# Patient Record
Sex: MALE | Race: WHITE | Employment: OTHER | URBAN - METROPOLITAN AREA
[De-identification: names, ages, dates, MRNs, and addresses within clinical notes are randomized per-mention and may not be internally consistent; named-entity substitution may affect disease eponyms.]

---

## 2017-03-07 ENCOUNTER — OFFICE VISIT - HEALTHEAST (OUTPATIENT)
Dept: FAMILY MEDICINE | Facility: CLINIC | Age: 37
End: 2017-03-07

## 2017-03-07 DIAGNOSIS — Z02.4 ENCOUNTER FOR DEPARTMENT OF TRANSPORTATION (DOT) EXAMINATION FOR TRUCKING LICENCE: ICD-10-CM

## 2017-03-07 ASSESSMENT — MIFFLIN-ST. JEOR: SCORE: 2062.72

## 2017-10-04 ENCOUNTER — OFFICE VISIT - HEALTHEAST (OUTPATIENT)
Dept: FAMILY MEDICINE | Facility: CLINIC | Age: 37
End: 2017-10-04

## 2017-10-04 DIAGNOSIS — N52.9 ERECTILE DYSFUNCTION: ICD-10-CM

## 2017-10-04 DIAGNOSIS — F17.200 NICOTINE DEPENDENCE: ICD-10-CM

## 2017-10-04 DIAGNOSIS — Z00.00 HEALTHCARE MAINTENANCE: ICD-10-CM

## 2017-10-04 DIAGNOSIS — R63.5 WEIGHT GAIN: ICD-10-CM

## 2017-10-04 LAB
CHOLEST SERPL-MCNC: 203 MG/DL
FASTING STATUS PATIENT QL REPORTED: YES
HBA1C MFR BLD: 5.5 % (ref 3.5–6)
HDLC SERPL-MCNC: 59 MG/DL
LDLC SERPL CALC-MCNC: 114 MG/DL
TRIGL SERPL-MCNC: 151 MG/DL

## 2017-10-04 ASSESSMENT — MIFFLIN-ST. JEOR: SCORE: 2069.24

## 2017-10-05 ENCOUNTER — COMMUNICATION - HEALTHEAST (OUTPATIENT)
Dept: FAMILY MEDICINE | Facility: CLINIC | Age: 37
End: 2017-10-05

## 2017-10-09 ENCOUNTER — OFFICE VISIT (OUTPATIENT)
Dept: FAMILY MEDICINE | Facility: CLINIC | Age: 37
End: 2017-10-09
Payer: COMMERCIAL

## 2017-10-09 ENCOUNTER — TELEPHONE (OUTPATIENT)
Dept: FAMILY MEDICINE | Facility: CLINIC | Age: 37
End: 2017-10-09

## 2017-10-09 VITALS
DIASTOLIC BLOOD PRESSURE: 78 MMHG | BODY MASS INDEX: 35.78 KG/M2 | TEMPERATURE: 98.1 F | SYSTOLIC BLOOD PRESSURE: 158 MMHG | HEIGHT: 71 IN | WEIGHT: 255.6 LBS | HEART RATE: 74 BPM

## 2017-10-09 DIAGNOSIS — E66.811 CLASS 1 OBESITY DUE TO EXCESS CALORIES WITH SERIOUS COMORBIDITY AND BODY MASS INDEX (BMI) OF 34.0 TO 34.9 IN ADULT: Primary | ICD-10-CM

## 2017-10-09 DIAGNOSIS — E66.09 CLASS 1 OBESITY DUE TO EXCESS CALORIES WITH SERIOUS COMORBIDITY AND BODY MASS INDEX (BMI) OF 34.0 TO 34.9 IN ADULT: Primary | ICD-10-CM

## 2017-10-09 PROCEDURE — 99203 OFFICE O/P NEW LOW 30 MIN: CPT | Performed by: FAMILY MEDICINE

## 2017-10-09 NOTE — MR AVS SNAPSHOT
"              After Visit Summary   10/9/2017    Savage Bustillo    MRN: [id number]           Patient Information     Date Of Birth          1980        Visit Information        Provider Department      10/9/2017 6:00 PM Bayron Walsh MD [facility]        Care Instructions    Get more veggies in diet .  Salad at least once a day, plus another serving of raw vegetables every day.    Eat less carbs, potatoes, rice, bread, pasta.    Consider weight watchers.  There is a good group in [place]  Or lifestyle program at  in [place]. [email or web address]      Check Blood pressure regularly  Your blood pressure was consistently high here.    I do not recommend medication for weight loss at this time.  I may reconsider after you have gone through a formal weight loss/nutrition program for at least 6 months.  Weight watchers, Moon mcgregor  Etc.               Follow-ups after your visit        Who to contact     Normal or non-critical lab and imaging results will be communicated to you by NeoDiagnostixhart, letter or phone within 4 business days after the clinic has received the results. If you do not hear from us within 7 days, please contact the clinic through [employer] Cor[employer] or phone. If you have a critical or abnormal lab result, we will notify you by phone as soon as possible.  Submit refill requests through [employer] or call your pharmacy and they will forward the refill request to us. Please allow 3 business days for your refill to be completed.          If you need to speak with a  for additional information , please call: [phone]             Additional Information About Your Visit        [employer] Information     [employer] lets you send messages to your doctor, view your test results, renew your prescriptions, schedule appointments and more. To sign up, go to [email or web address] . Click on \"Log in\" on the left side of the screen, which will take you to the " "Welcome page. Then click on \"Sign up Now\" on the right side of the page.     You will be asked to enter the access code listed below, as well as some personal information. Please follow the directions to create your username and password.     Your access code is: [email or web address]  Expires: 2018  6:23 PM     Your access code will  in 90 days. If you need help or a new code, please call your [place] clinic or [phone].        Care EveryWhere ID     This is your Care EveryWhere ID. This could be used by other organizations to access your [place] medical records  [id number]        Your Vitals Were     Pulse Temperature Height BMI (Body Mass Index)          74 98.1  F (36.7  C) (Tympanic) 5' 10.75\" (1.797 m) 35.9 kg/m2         Blood Pressure from Last 3 Encounters:   10/09/17 158/78   10/16/13 131/69   12 155/75    Weight from Last 3 Encounters:   10/09/17 255 lb 9.6 oz (115.9 kg)   10/16/13 212 lb (96.2 kg)   11 195 lb 3.2 oz (88.5 kg)              Today, you had the following     No orders found for display       Primary Care Provider    None Specified       No primary provider on file.        Equal Access to Services     [facility]: Hadii brian cornello Sojosemanuel, waaxda luqadaha, qaybta kaalmada adeegyada, sandra venegas . So [facility] [phone].    ATENCIÓN: Si habla español, tiene a dimas disposición servicios gratuitos de asistencia lingüística. Llame al [phone].    We comply with applicable federal civil rights laws and [place] laws. We do not discriminate on the basis of race, color, national origin, age, disability, sex, sexual orientation, or gender identity.            Thank you!     Thank you for choosing [facility]  for your care. Our goal is always to provide you with excellent care. Hearing back from our patients is one way we can continue to improve our services. Please take a few minutes to complete the written survey that you may receive " in the mail after your visit with us. Thank you!             Your Updated Medication List - Protect others around you: Learn how to safely use, store and throw away your medicines at www.disposemymeds.org.          This list is accurate as of: 10/9/17  6:23 PM.  Always use your most recent med list.                   Brand Name Dispense Instructions for use Diagnosis    ALEVE 220 MG capsule   Generic drug:  naproxen sodium      1 CAPSULE EVERY 12 HOURS AS NEEDED

## 2017-10-09 NOTE — PATIENT INSTRUCTIONS
Get more veggies in diet .  Salad at least once a day, plus another serving of raw vegetables every day.    Eat less carbs, potatoes, rice, bread, pasta.    Consider weight watchers.  There is a good group in [place]  Or lifestyle program at  in [place]. [email or web address]      Check Blood pressure regularly  Your blood pressure was consistently high here.    I do not recommend medication for weight loss at this time.  I may reconsider after you have gone through a formal weight loss/nutrition program for at least 6 months.  Weight watchers, Moon mcgregor  Etc.

## 2017-10-09 NOTE — PROGRESS NOTES
"SUBJECTIVE:                                                    Savage Bustillo is a 36 year old male who presents to clinic today for the following health issues:    Chief Complaint   Patient presents with     Establish Care     **Here to discuss weight loss.      Problem list and histories reviewed & adjusted, as indicated.  Additional history:     Patient Active Problem List   Diagnosis     Streptococcal sore throat     Abscess of anal and rectal regions     CARDIOVASCULAR SCREENING; LDL GOAL LESS THAN 160     History reviewed. No pertinent surgical history.    Social History   Substance Use Topics     Smoking status: Former Smoker     Packs/day: 0.50     Types: Cigarettes     Smokeless tobacco: Current User     Types: Chew      Comment: 3 tins a week     Alcohol use No      Comment: quit 1-2xweek     History reviewed. No pertinent family history.      ROS:  Constitutional, HEENT, cardiovascular, pulmonary, gi and gu systems are negative, except as otherwise noted.      OBJECTIVE:                                                    /90 (BP Location: Right arm, Patient Position: Sitting, Cuff Size: Adult Large)  Pulse 74  Temp 98.1  F (36.7  C) (Tympanic)  Ht 5' 10.75\" (1.797 m)  Wt 255 lb 9.6 oz (115.9 kg)  BMI 35.9 kg/m2 Body mass index is 35.9 kg/(m^2).   GENERAL: healthy, alert, well nourished, well hydrated, no distress  HENT: ear canals- normal; TMs- normal; Nose- normal; Mouth- no ulcers, no lesions  NECK: no tenderness, no adenopathy, no asymmetry, no masses, no stiffness; thyroid- normal to palpation  RESP: lungs clear to auscultation - no rales, no rhonchi, no wheezes  CV: regular rates and rhythm, normal S1 S2, no S3 or S4 and no murmur, no click or rub -  ABDOMEN: soft, no tenderness, no  hepatosplenomegaly, no masses, normal bowel sounds       ASSESSMENT/PLAN:                                                      (E66.09,  Z68.34) Class 2 obesity due to excess calories with serious " comorbidity and body mass index (BMI) of 35.0 to 35.9 in adult  (primary encounter diagnosis)   he wants medical management discussed phentermine, topamax, Xenical. I would not recommend a stimulant with his hypertension.  He also has never participated in a forml weight loss program.  He discussed healthy lifestyle program at the [facility] going on now,  He is interested in weight watchers. I discussed group in [place]  I think he will look for another provider who prescribe weight loss medication     Patient Instructions   Get more veggies in diet .  Salad at least once a day, plus another serving of raw vegetables every day.    Eat less carbs, potatoes, rice, bread, pasta.    Consider weight watchers.  There is a good group in [place]  Or lifestyle program at  in [place]. [email or web address]      Check Blood pressure regularly  Your blood pressure was consistently high here.    I do not recommend medication for weight loss at this time.  I may reconsider after you have gone through a formal weight loss/nutrition program for at least 6 months.  Weight watchers, Moon mcgregor  Etc.            reports that he has quit smoking. His smoking use included Cigarettes. He smoked 0.50 packs per day. His smokeless tobacco use includes Chew.    [facility]

## 2017-10-09 NOTE — TELEPHONE ENCOUNTER
Spoke with patient and said that the Provider here may if after assessment; it is determined to be a good plan. New patient appointment made for this evening.  Chaka Pruett RN

## 2017-10-09 NOTE — TELEPHONE ENCOUNTER
Reason for Call:  Other call back    Detailed comments: Savage LEFT MESSAGE:  He is wondering if our clinic prescribes weight loss medication?  No other information was left as to what name of medication, etc. Doesn't appear that he has been seen at the [facility].   Please call and assess. Thank you..Aissatou Fernandez    Phone Number Patient can be reached at: Home number on file [phone] (home)    Best Time: did not specify on message    Can we leave a detailed message on this number? did not specify on message    Call taken on 10/9/2017 at 11:56 AM by Aissatou Fernandez

## 2017-11-03 ENCOUNTER — OFFICE VISIT - HEALTHEAST (OUTPATIENT)
Dept: FAMILY MEDICINE | Facility: CLINIC | Age: 37
End: 2017-11-03

## 2017-11-03 ENCOUNTER — AMBULATORY - HEALTHEAST (OUTPATIENT)
Dept: LAB | Facility: CLINIC | Age: 37
End: 2017-11-03

## 2017-11-03 DIAGNOSIS — M79.672 FOOT PAIN, BILATERAL: ICD-10-CM

## 2017-11-03 DIAGNOSIS — M79.671 FOOT PAIN, BILATERAL: ICD-10-CM

## 2017-11-03 DIAGNOSIS — E66.09 CLASS 1 OBESITY DUE TO EXCESS CALORIES WITH SERIOUS COMORBIDITY AND BODY MASS INDEX (BMI) OF 34.0 TO 34.9 IN ADULT: ICD-10-CM

## 2017-11-03 DIAGNOSIS — R63.5 WEIGHT GAIN: ICD-10-CM

## 2017-11-03 DIAGNOSIS — E66.811 CLASS 1 OBESITY DUE TO EXCESS CALORIES WITH SERIOUS COMORBIDITY AND BODY MASS INDEX (BMI) OF 34.0 TO 34.9 IN ADULT: ICD-10-CM

## 2017-11-03 ASSESSMENT — MIFFLIN-ST. JEOR: SCORE: 2062.95

## 2017-11-04 ENCOUNTER — COMMUNICATION - HEALTHEAST (OUTPATIENT)
Dept: FAMILY MEDICINE | Facility: CLINIC | Age: 37
End: 2017-11-04

## 2017-11-07 ENCOUNTER — COMMUNICATION - HEALTHEAST (OUTPATIENT)
Dept: FAMILY MEDICINE | Facility: CLINIC | Age: 37
End: 2017-11-07

## 2017-11-09 ENCOUNTER — RECORDS - HEALTHEAST (OUTPATIENT)
Dept: ADMINISTRATIVE | Facility: OTHER | Age: 37
End: 2017-11-09

## 2017-11-15 ENCOUNTER — COMMUNICATION - HEALTHEAST (OUTPATIENT)
Dept: FAMILY MEDICINE | Facility: CLINIC | Age: 37
End: 2017-11-15

## 2017-11-17 ENCOUNTER — COMMUNICATION - HEALTHEAST (OUTPATIENT)
Dept: TELEHEALTH | Facility: CLINIC | Age: 37
End: 2017-11-17

## 2017-11-17 ENCOUNTER — OFFICE VISIT - HEALTHEAST (OUTPATIENT)
Dept: FAMILY MEDICINE | Facility: CLINIC | Age: 37
End: 2017-11-17

## 2017-11-17 DIAGNOSIS — S96.911A RIGHT FOOT STRAIN: ICD-10-CM

## 2017-11-17 ASSESSMENT — MIFFLIN-ST. JEOR: SCORE: 2040.32

## 2017-11-27 ENCOUNTER — COMMUNICATION - HEALTHEAST (OUTPATIENT)
Dept: FAMILY MEDICINE | Facility: CLINIC | Age: 37
End: 2017-11-27

## 2017-11-27 DIAGNOSIS — F17.200 NICOTINE DEPENDENCE: ICD-10-CM

## 2017-12-04 ENCOUNTER — OFFICE VISIT - HEALTHEAST (OUTPATIENT)
Dept: FAMILY MEDICINE | Facility: CLINIC | Age: 37
End: 2017-12-04

## 2017-12-04 DIAGNOSIS — E66.811 CLASS 1 OBESITY DUE TO EXCESS CALORIES WITH SERIOUS COMORBIDITY AND BODY MASS INDEX (BMI) OF 34.0 TO 34.9 IN ADULT: ICD-10-CM

## 2017-12-04 DIAGNOSIS — E66.09 CLASS 1 OBESITY DUE TO EXCESS CALORIES WITH SERIOUS COMORBIDITY AND BODY MASS INDEX (BMI) OF 34.0 TO 34.9 IN ADULT: ICD-10-CM

## 2017-12-04 ASSESSMENT — MIFFLIN-ST. JEOR: SCORE: 2028.02

## 2017-12-06 ENCOUNTER — OFFICE VISIT - HEALTHEAST (OUTPATIENT)
Dept: PODIATRY | Age: 37
End: 2017-12-06

## 2017-12-06 DIAGNOSIS — L84 CALLUS OF FOOT: ICD-10-CM

## 2017-12-06 DIAGNOSIS — M21.629 BUNIONETTE OF UNSPECIFIED FOOT: ICD-10-CM

## 2017-12-06 ASSESSMENT — MIFFLIN-ST. JEOR: SCORE: 2025.3

## 2017-12-08 ENCOUNTER — COMMUNICATION - HEALTHEAST (OUTPATIENT)
Dept: OTHER | Facility: CLINIC | Age: 37
End: 2017-12-08

## 2017-12-16 ENCOUNTER — COMMUNICATION - HEALTHEAST (OUTPATIENT)
Dept: FAMILY MEDICINE | Facility: CLINIC | Age: 37
End: 2017-12-16

## 2017-12-16 DIAGNOSIS — N52.9 ERECTILE DYSFUNCTION: ICD-10-CM

## 2018-01-05 ENCOUNTER — OFFICE VISIT - HEALTHEAST (OUTPATIENT)
Dept: FAMILY MEDICINE | Facility: CLINIC | Age: 38
End: 2018-01-05

## 2018-01-05 DIAGNOSIS — E66.09 CLASS 1 OBESITY DUE TO EXCESS CALORIES WITH SERIOUS COMORBIDITY AND BODY MASS INDEX (BMI) OF 34.0 TO 34.9 IN ADULT: ICD-10-CM

## 2018-01-05 DIAGNOSIS — E66.811 CLASS 1 OBESITY DUE TO EXCESS CALORIES WITH SERIOUS COMORBIDITY AND BODY MASS INDEX (BMI) OF 34.0 TO 34.9 IN ADULT: ICD-10-CM

## 2018-02-05 ENCOUNTER — OFFICE VISIT - HEALTHEAST (OUTPATIENT)
Dept: FAMILY MEDICINE | Facility: CLINIC | Age: 38
End: 2018-02-05

## 2018-02-05 DIAGNOSIS — E66.09 CLASS 1 OBESITY DUE TO EXCESS CALORIES WITH SERIOUS COMORBIDITY AND BODY MASS INDEX (BMI) OF 34.0 TO 34.9 IN ADULT: ICD-10-CM

## 2018-02-05 DIAGNOSIS — E78.1 HYPERTRIGLYCERIDEMIA: ICD-10-CM

## 2018-02-05 DIAGNOSIS — E66.811 CLASS 1 OBESITY DUE TO EXCESS CALORIES WITH SERIOUS COMORBIDITY AND BODY MASS INDEX (BMI) OF 34.0 TO 34.9 IN ADULT: ICD-10-CM

## 2018-03-06 ENCOUNTER — OFFICE VISIT - HEALTHEAST (OUTPATIENT)
Dept: FAMILY MEDICINE | Facility: CLINIC | Age: 38
End: 2018-03-06

## 2018-03-06 DIAGNOSIS — E78.1 HYPERTRIGLYCERIDEMIA: ICD-10-CM

## 2018-03-06 DIAGNOSIS — E66.09 CLASS 1 OBESITY DUE TO EXCESS CALORIES WITH SERIOUS COMORBIDITY AND BODY MASS INDEX (BMI) OF 34.0 TO 34.9 IN ADULT: ICD-10-CM

## 2018-03-06 DIAGNOSIS — E66.811 CLASS 1 OBESITY DUE TO EXCESS CALORIES WITH SERIOUS COMORBIDITY AND BODY MASS INDEX (BMI) OF 34.0 TO 34.9 IN ADULT: ICD-10-CM

## 2018-03-06 ASSESSMENT — MIFFLIN-ST. JEOR: SCORE: 2044.35

## 2018-04-04 ENCOUNTER — COMMUNICATION - HEALTHEAST (OUTPATIENT)
Dept: FAMILY MEDICINE | Facility: CLINIC | Age: 38
End: 2018-04-04

## 2018-04-04 DIAGNOSIS — F17.200 NICOTINE DEPENDENCE: ICD-10-CM

## 2018-05-14 ENCOUNTER — OFFICE VISIT - HEALTHEAST (OUTPATIENT)
Dept: FAMILY MEDICINE | Facility: CLINIC | Age: 38
End: 2018-05-14

## 2018-05-14 DIAGNOSIS — J40 BRONCHITIS: ICD-10-CM

## 2018-05-16 ENCOUNTER — COMMUNICATION - HEALTHEAST (OUTPATIENT)
Dept: FAMILY MEDICINE | Facility: CLINIC | Age: 38
End: 2018-05-16

## 2018-05-31 ENCOUNTER — RECORDS - HEALTHEAST (OUTPATIENT)
Dept: GENERAL RADIOLOGY | Facility: CLINIC | Age: 38
End: 2018-05-31

## 2018-05-31 ENCOUNTER — OFFICE VISIT - HEALTHEAST (OUTPATIENT)
Dept: FAMILY MEDICINE | Facility: CLINIC | Age: 38
End: 2018-05-31

## 2018-05-31 DIAGNOSIS — R05.9 COUGH: ICD-10-CM

## 2018-06-04 ENCOUNTER — COMMUNICATION - HEALTHEAST (OUTPATIENT)
Dept: FAMILY MEDICINE | Facility: CLINIC | Age: 38
End: 2018-06-04

## 2018-06-04 DIAGNOSIS — N52.9 ERECTILE DYSFUNCTION: ICD-10-CM

## 2018-07-16 ENCOUNTER — RECORDS - HEALTHEAST (OUTPATIENT)
Dept: GENERAL RADIOLOGY | Facility: CLINIC | Age: 38
End: 2018-07-16

## 2018-07-16 ENCOUNTER — OFFICE VISIT - HEALTHEAST (OUTPATIENT)
Dept: FAMILY MEDICINE | Facility: CLINIC | Age: 38
End: 2018-07-16

## 2018-07-16 DIAGNOSIS — R05.9 COUGH: ICD-10-CM

## 2018-07-16 ASSESSMENT — MIFFLIN-ST. JEOR: SCORE: 2113.18

## 2018-07-26 ENCOUNTER — COMMUNICATION - HEALTHEAST (OUTPATIENT)
Dept: FAMILY MEDICINE | Facility: CLINIC | Age: 38
End: 2018-07-26

## 2018-07-26 DIAGNOSIS — N52.9 ERECTILE DYSFUNCTION: ICD-10-CM

## 2018-08-06 ENCOUNTER — COMMUNICATION - HEALTHEAST (OUTPATIENT)
Dept: FAMILY MEDICINE | Facility: CLINIC | Age: 38
End: 2018-08-06

## 2018-08-06 DIAGNOSIS — F17.200 NICOTINE DEPENDENCE: ICD-10-CM

## 2018-08-07 ENCOUNTER — TRANSFERRED RECORDS (OUTPATIENT)
Dept: HEALTH INFORMATION MANAGEMENT | Facility: CLINIC | Age: 38
End: 2018-08-07

## 2018-08-07 ENCOUNTER — COMMUNICATION - HEALTHEAST (OUTPATIENT)
Dept: FAMILY MEDICINE | Facility: CLINIC | Age: 38
End: 2018-08-07

## 2018-08-07 LAB
ALT SERPL-CCNC: 30 U/L (ref 0–52)
AST SERPL-CCNC: 30 U/L (ref 0–39)

## 2018-08-09 LAB
CREAT SERPL-MCNC: 0.6 MG/DL (ref 0.8–1.3)
GFR SERPL CREATININE-BSD FRML MDRD: 152 ML/MIN/1.73M2
GLUCOSE SERPL-MCNC: 92 MG/DL (ref 70–105)
POTASSIUM SERPL-SCNC: 4.8 MMOL/L (ref 3.5–5.1)

## 2018-08-13 ENCOUNTER — HOSPITAL ENCOUNTER (EMERGENCY)
Facility: CLINIC | Age: 38
Discharge: HOME | End: 2018-08-13
Attending: EMERGENCY MEDICINE | Admitting: EMERGENCY MEDICINE
Payer: COMMERCIAL

## 2018-08-13 ENCOUNTER — COMMUNICATION - HEALTHEAST (OUTPATIENT)
Dept: SCHEDULING | Facility: CLINIC | Age: 38
End: 2018-08-13

## 2018-08-13 ENCOUNTER — APPOINTMENT (OUTPATIENT)
Dept: GENERAL RADIOLOGY | Facility: CLINIC | Age: 38
End: 2018-08-13
Attending: EMERGENCY MEDICINE
Payer: COMMERCIAL

## 2018-08-13 VITALS
WEIGHT: 270 LBS | TEMPERATURE: 99.2 F | DIASTOLIC BLOOD PRESSURE: 98 MMHG | SYSTOLIC BLOOD PRESSURE: 151 MMHG | OXYGEN SATURATION: 96 % | RESPIRATION RATE: 21 BRPM | HEART RATE: 77 BPM | BODY MASS INDEX: 36.57 KG/M2 | HEIGHT: 72 IN

## 2018-08-13 DIAGNOSIS — R07.1 PAINFUL RESPIRATION: ICD-10-CM

## 2018-08-13 DIAGNOSIS — J18.9 PNEUMONIA OF RIGHT LOWER LOBE DUE TO INFECTIOUS ORGANISM: ICD-10-CM

## 2018-08-13 PROCEDURE — 71046 X-RAY EXAM CHEST 2 VIEWS: CPT

## 2018-08-13 PROCEDURE — G0463 HOSPITAL OUTPT CLINIC VISIT: HCPCS | Mod: 25

## 2018-08-13 PROCEDURE — 25000125 ZZHC RX 250: Performed by: EMERGENCY MEDICINE

## 2018-08-13 PROCEDURE — 94640 AIRWAY INHALATION TREATMENT: CPT

## 2018-08-13 PROCEDURE — 25000132 ZZH RX MED GY IP 250 OP 250 PS 637: Performed by: EMERGENCY MEDICINE

## 2018-08-13 PROCEDURE — 99214 OFFICE O/P EST MOD 30 MIN: CPT | Mod: Z6 | Performed by: EMERGENCY MEDICINE

## 2018-08-13 RX ORDER — IBUPROFEN 200 MG
800 TABLET ORAL ONCE
Status: COMPLETED | OUTPATIENT
Start: 2018-08-13 | End: 2018-08-13

## 2018-08-13 RX ORDER — IPRATROPIUM BROMIDE AND ALBUTEROL SULFATE 2.5; .5 MG/3ML; MG/3ML
1 SOLUTION RESPIRATORY (INHALATION) EVERY 6 HOURS PRN
Qty: 30 VIAL | Refills: 1 | Status: SHIPPED | OUTPATIENT
Start: 2018-08-13 | End: 2021-03-17

## 2018-08-13 RX ORDER — PREDNISONE 20 MG/1
40 TABLET ORAL DAILY
Qty: 10 TABLET | Refills: 0 | Status: SHIPPED | OUTPATIENT
Start: 2018-08-13 | End: 2018-08-18

## 2018-08-13 RX ORDER — IPRATROPIUM BROMIDE AND ALBUTEROL SULFATE 2.5; .5 MG/3ML; MG/3ML
3 SOLUTION RESPIRATORY (INHALATION) ONCE
Status: COMPLETED | OUTPATIENT
Start: 2018-08-13 | End: 2018-08-13

## 2018-08-13 RX ORDER — ALBUTEROL SULFATE 90 UG/1
2 AEROSOL, METERED RESPIRATORY (INHALATION) EVERY 6 HOURS
Qty: 1 INHALER | Refills: 0 | Status: SHIPPED | OUTPATIENT
Start: 2018-08-13 | End: 2021-03-17

## 2018-08-13 RX ORDER — IBUPROFEN 800 MG/1
800 TABLET, FILM COATED ORAL EVERY 8 HOURS PRN
Qty: 60 TABLET | Refills: 0 | Status: SHIPPED | OUTPATIENT
Start: 2018-08-13 | End: 2018-08-21

## 2018-08-13 RX ADMIN — IPRATROPIUM BROMIDE AND ALBUTEROL SULFATE 3 ML: .5; 3 SOLUTION RESPIRATORY (INHALATION) at 17:19

## 2018-08-13 RX ADMIN — IBUPROFEN 800 MG: 200 TABLET, FILM COATED ORAL at 17:18

## 2018-08-13 NOTE — ED AVS SNAPSHOT
[facility]    [address] [address]    Phone:  [phone]    Fax:  [phone]                                       Savage Bustillo   MRN: [id number]    Department:  [facility]   Date of Visit:  8/13/2018           After Visit Summary Signature Page     I have received my discharge instructions, and my questions have been answered. I have discussed any challenges I see with this plan with the nurse or doctor.    ..........................................................................................................................................  Patient/Patient Representative Signature      ..........................................................................................................................................  Patient Representative Print Name and Relationship to Patient    ..................................................               ................................................  Date                                            Time    ..........................................................................................................................................  Reviewed by Signature/Title    ...................................................              ..............................................  Date                                                            Time

## 2018-08-13 NOTE — ED AVS SNAPSHOT
[facility]    [address] [address]    Phone:  [phone]    Fax:  [phone]                                       Savage Bustillo   MRN: [id number]    Department:  [facility]   Date of Visit:  8/13/2018           Patient Information     Date Of Birth          1980        Your diagnoses for this visit were:     Painful respiration     Pneumonia of right lower lobe due to infectious organism (H)        You were seen by Shelton Johnson MD.      24 Hour Appointment Hotline       To make an appointment at any [facility], call [phone] ([phone]). If you don't have a family doctor or clinic, we will help you find one. [facility] clinics are conveniently located to serve the needs of you and your family.             Review of your medicines      START taking        Dose / Directions Last dose taken    albuterol 108 (90 Base) MCG/ACT inhaler   Commonly known as:  PROAIR HFA/PROVENTIL HFA/VENTOLIN HFA   Dose:  2 puff   Quantity:  1 Inhaler        Inhale 2 puffs into the lungs every 6 hours for 10 days   Refills:  0        amoxicillin-clavulanate 875-125 MG per tablet   Commonly known as:  AUGMENTIN   Dose:  1 tablet   Quantity:  14 tablet        Take 1 tablet by mouth 2 times daily for 7 days   Refills:  0        ibuprofen 800 MG tablet   Commonly known as:  ADVIL/MOTRIN   Dose:  800 mg   Quantity:  60 tablet        Take 1 tablet (800 mg) by mouth every 8 hours as needed for moderate pain   Refills:  0        ipratropium - albuterol 0.5 mg/2.5 mg/3 mL 0.5-2.5 (3) MG/3ML neb solution   Commonly known as:  DUONEB   Dose:  1 vial   Quantity:  30 vial        Take 1 vial (3 mLs) by nebulization every 6 hours as needed for shortness of breath / dyspnea or wheezing   Refills:  1        predniSONE 20 MG tablet   Commonly known as:  DELTASONE   Dose:  40 mg   Quantity:  10 tablet        Take 2 tablets (40 mg) by mouth daily for 5  days   Refills:  0          Our records show that you are taking the medicines listed below. If these are incorrect, please call your family doctor or clinic.        Dose / Directions Last dose taken    ALEVE 220 MG capsule   Generic drug:  naproxen sodium        1 CAPSULE EVERY 12 HOURS AS NEEDED   Refills:  0        DOXYCYCLINE HYCLATE PO        Refills:  0        METHADONE HCL PO        Refills:  0                Information about OPIOIDS     PRESCRIPTION OPIOIDS: WHAT YOU NEED TO KNOW   We gave you an opioid (narcotic) pain medicine. It is important to manage your pain, but opioids are not always the best choice. You should first try all the other options your care team gave you. Take this medicine for as short a time (and as few doses) as possible.    Some activities can increase your pain, such as bandage changes or therapy sessions. It may help to take your pain medicine 30 to 60 minutes before these activities. Reduce your stress by getting enough sleep, working on hobbies you enjoy and practicing relaxation or meditation. Talk to your care team about ways to manage your pain beyond prescription opioids.    These medicines have risks:    DO NOT drive when on new or higher doses of pain medicine. These medicines can affect your alertness and reaction times, and you could be arrested for driving under the influence (DUI). If you need to use opioids long-term, talk to your care team about driving.    DO NOT operate heavy machinery    DO NOT do any other dangerous activities while taking these medicines.    DO NOT drink any alcohol while taking these medicines.     If the opioid prescribed includes acetaminophen, DO NOT take with any other medicines that contain acetaminophen. Read all labels carefully. Look for the word  acetaminophen  or  Tylenol.  Ask your pharmacist if you have questions or are unsure.    You can get addicted to pain medicines, especially if you have a history of addiction (chemical, alcohol  or substance dependence). Talk to your care team about ways to reduce this risk.    All opioids tend to cause constipation. Drink plenty of water and eat foods that have a lot of fiber, such as fruits, vegetables, prune juice, apple juice and high-fiber cereal. Take a laxative (Miralax, milk of magnesia, Colace, Senna) if you don t move your bowels at least every other day. Other side effects include upset stomach, sleepiness, dizziness, throwing up, tolerance (needing more of the medicine to have the same effect), physical dependence and slowed breathing.    Store your pills in a secure place, locked if possible. We will not replace any lost or stolen medicine. If you don t finish your medicine, please throw away (dispose) as directed by your pharmacist. The [place] Pollution Control Agency has more information about safe disposal: https://www.pca.[facility].mn.us/living-green/managing-unwanted-medications        Prescriptions were sent or printed at these locations (5 Prescriptions)                   [facility] [place] [id number]   [address] [address]    Telephone:  [phone]   Fax:  [phone]   Hours:                  E-Prescribed (5 of 5)         albuterol (PROAIR HFA/PROVENTIL HFA/VENTOLIN HFA) 108 (90 Base) MCG/ACT inhaler               amoxicillin-clavulanate (AUGMENTIN) 875-125 MG per tablet               ibuprofen (ADVIL/MOTRIN) 800 MG tablet               ipratropium - albuterol 0.5 mg/2.5 mg/3 mL (DUONEB) 0.5-2.5 (3) MG/3ML neb solution               predniSONE (DELTASONE) 20 MG tablet                Procedures and tests performed during your visit     XR Chest 2 Views      Orders Needing Specimen Collection     None      Pending Results     No orders found from 8/11/2018 to 8/14/2018.            Pending Culture Results     No orders found from 8/11/2018 to 8/14/2018.            Pending Results Instructions     If you had any lab results that were not  "finalized at the time of your Discharge, you can call the ED Lab Result RN at [phone]. You will be contacted by this team for any positive Lab results or changes in treatment. The nurses are available 7 days a week from 10A to 6:30P.  You can leave a message 24 hours per day and they will return your call.        Test Results From Your Hospital Stay        2018  5:09 PM      Narrative     CHEST TWO VIEWS  2018 4:52 PM     HISTORY:  Cough with recent diagnosis of pneumonia.     COMPARISON: None.        Impression     IMPRESSION: There are some infiltrates in the right lower lobe and  right middle lobe consistent with pneumonia. Heart size is mildly  prominent. No pleural effusions. Left lung appears clear.    NICOLLE GUDINO MD                Thank you for choosing [facility]       Thank you for choosing [facility] for your care. Our goal is always to provide you with excellent care. Hearing back from our patients is one way we can continue to improve our services. Please take a few minutes to complete the written survey that you may receive in the mail after you visit with us. Thank you!        [facility] Information     [facility] lets you send messages to your doctor, view your test results, renew your prescriptions, schedule appointments and more. To sign up, go to [email or web address] . Click on \"Log in\" on the left side of the screen, which will take you to the Welcome page. Then click on \"Sign up Now\" on the right side of the page.     You will be asked to enter the access code listed below, as well as some personal information. Please follow the directions to create your username and password.     Your access code is: [email or web address]  Expires: 2018  6:18 PM     Your access code will  in 90 days. If you need help or a new code, please call your [facility] clinic or [phone].        Care EveryWhere ID     This is your Care EveryWhere ID. This could be used by other organizations to access your " [facility] medical records  [id number]        Equal Access to Services     KARLY LOPEZ : Hadii brian Moseley, munira pantoja, shad see, sandra bojorquez. So [facility] [phone].    ATENCIÓN: Si habla español, tiene a dimas disposición servicios gratuitos de asistencia lingüística. Llame al [phone].    We comply with applicable federal civil rights laws and [place] laws. We do not discriminate on the basis of race, color, national origin, age, disability, sex, sexual orientation, or gender identity.            After Visit Summary       This is your record. Keep this with you and show to your community pharmacist(s) and doctor(s) at your next visit.

## 2018-08-13 NOTE — ED PROVIDER NOTES
History     Chief Complaint   Patient presents with     Cough     was recently hospitalized with pneumonia and feels as though sx are returning     HPI  Savage Bustillo is a 37 year old male who presents with concerns for recurrent or worsening pneumonia.  Patient was recently hospitalized in [place] for pneumonia.  He states he was in the hospital for 4 days.  Sent home on doxycycline which he has been taking.  Felt good yesterday but today feels like symptoms are recurring.  He states however is not been coughing a lot but has increased discomfort on the right lateral chest and towards his right shoulder.  He denies left-sided chest pain.  Denies abdominal pain, nausea vomiting.  He had a fever of 100.3 at home today.  He is quite anxious.  Former smoker who quit 1 week ago.  No exposure to infectious illness.  No leg pain or swelling.  No history of DVT or PE.    Problem List:    Patient Active Problem List    Diagnosis Date Noted     CARDIOVASCULAR SCREENING; LDL GOAL LESS THAN 160 09/07/2011     Priority: Medium     Abscess of anal and rectal regions 05/05/2009     Priority: Medium     Streptococcal sore throat 03/30/2008     Priority: Medium        Past Medical History:    History reviewed. No pertinent past medical history.    Past Surgical History:    History reviewed. No pertinent surgical history.    Family History:    No family history on file.    Social History:  Marital Status:   [2]  Social History   Substance Use Topics     Smoking status: Former Smoker     Packs/day: 0.50     Types: Cigarettes     Smokeless tobacco: Current User     Types: Chew      Comment: 3 tins a week     Alcohol use No      Comment: quit 1-2xweek        Medications:      albuterol (PROAIR HFA/PROVENTIL HFA/VENTOLIN HFA) 108 (90 Base) MCG/ACT inhaler   amoxicillin-clavulanate (AUGMENTIN) 875-125 MG per tablet   DOXYCYCLINE HYCLATE PO   ibuprofen (ADVIL/MOTRIN) 800 MG tablet   ipratropium - albuterol 0.5 mg/2.5  mg/3 mL (DUONEB) 0.5-2.5 (3) MG/3ML neb solution   METHADONE HCL PO   predniSONE (DELTASONE) 20 MG tablet   ALEVE 220 MG OR CAPS         Review of Systems other systems reviewed and are negative.    Physical Exam   BP: (!) 151/98  Pulse: 66  Temp: 99.2  F (37.3  C)  Resp: 16  Height: 182.9 cm (6')  Weight: 122.5 kg (270 lb)  SpO2: 95 %      Physical Exam an alert male who very anxious regarding possible recurrent pneumonia.  HEENT shows no facial swelling or symmetry.  No nasal congestion or drainage.  Speech is clear and concise.  Neck is supple without stridor.  Lungs reveal crackles at the base on the right.  No wheezes.  Cardiac auscultation is normal.  Abdomen is morbidly obese but benign to palpation.  He has tenderness of the lateral chest wall without crepitus or step-off.  Complains of pain to his right shoulder but has full range of motion of the shoulder without reproduction of pain or limitation of movement.  Extremities reveal no pitting edema, calf or thigh tenderness.    ED Course     ED Course     Procedures           Results for orders placed or performed during the hospital encounter of 08/13/18   XR Chest 2 Views    Narrative    CHEST TWO VIEWS  8/13/2018 4:52 PM     HISTORY:  Cough with recent diagnosis of pneumonia.     COMPARISON: None.      Impression    IMPRESSION: There are some infiltrates in the right lower lobe and  right middle lobe consistent with pneumonia. Heart size is mildly  prominent. No pleural effusions. Left lung appears clear.            Critical Care time:  none               Results for orders placed or performed during the hospital encounter of 08/13/18 (from the past 24 hour(s))   XR Chest 2 Views    Narrative    CHEST TWO VIEWS  8/13/2018 4:52 PM     HISTORY:  Cough with recent diagnosis of pneumonia.     COMPARISON: None.      Impression    IMPRESSION: There are some infiltrates in the right lower lobe and  right middle lobe consistent with pneumonia. Heart size is  mildly  prominent. No pleural effusions. Left lung appears clear.    NICOLLE GUDINO MD       Medications   ibuprofen (ADVIL/MOTRIN) tablet 800 mg (800 mg Oral Given 8/13/18 1718)   ipratropium - albuterol 0.5 mg/2.5 mg/3 mL (DUONEB) neb solution 3 mL (3 mLs Nebulization Given 8/13/18 1719)     Patient is given oral ibuprofen.  He was given a DuoNeb with some improvement.  Assessments & Plan (with Medical Decision Making)   Savage Bustillo is a 37 year old male who presents with concerns for recurrent or worsening pneumonia.  Patient was recently hospitalized in [place] for pneumonia.  He states he was in the hospital for 4 days.  Sent home on doxycycline which he has been taking.  Felt good yesterday but today feels like symptoms are recurring.  He states however is not been coughing a lot but has increased discomfort on the right lateral chest and towards his right shoulder.  He denies left-sided chest pain.  Denies abdominal pain, nausea vomiting.  He had a fever of 100.3 at home today.  He is quite anxious.  Former smoker who quit 1 week ago.  No exposure to infectious illness.  No leg pain or swelling.  No history of DVT or PE.  On presentation he was afebrile and vitally stable.  Not hypoxic.  He was somewhat anxious.  He had reproducible pain on the lateral chest wall.  On auscultation rhonchi at the base without wheezing.  P x-ray did show a pneumonia described above.  We are able to obtain records from the [place] hospitalization at that time CT showed a dense middle lobe and right lower lobe infiltrates.  By report this looks markedly improved.  He is currently just on Doxy.  Will broaden coverage with Augmentin.  Will also add duo nebs to use at home.  He has albuterol MDI which is almost out so we refilled this.  Short course of steroids.  Handout on pneumonia is provided.  Reasons to return for reassessment discussed.  He is congratulated on stopping smoking.  I have reviewed the nursing  notes.    I have reviewed the findings, diagnosis, plan and need for follow up with the patient.       New Prescriptions    ALBUTEROL (PROAIR HFA/PROVENTIL HFA/VENTOLIN HFA) 108 (90 BASE) MCG/ACT INHALER    Inhale 2 puffs into the lungs every 6 hours for 10 days    AMOXICILLIN-CLAVULANATE (AUGMENTIN) 875-125 MG PER TABLET    Take 1 tablet by mouth 2 times daily for 7 days    IBUPROFEN (ADVIL/MOTRIN) 800 MG TABLET    Take 1 tablet (800 mg) by mouth every 8 hours as needed for moderate pain    IPRATROPIUM - ALBUTEROL 0.5 MG/2.5 MG/3 ML (DUONEB) 0.5-2.5 (3) MG/3ML NEB SOLUTION    Take 1 vial (3 mLs) by nebulization every 6 hours as needed for shortness of breath / dyspnea or wheezing    PREDNISONE (DELTASONE) 20 MG TABLET    Take 2 tablets (40 mg) by mouth daily for 5 days       Final diagnoses:   Painful respiration   Pneumonia of right lower lobe due to infectious organism (H)       8/13/2018   [facility] EMERGENCY DEPARTMENT     Shelton Johnson MD  08/13/18 1814       Shelton Johnson MD  08/13/18 1827

## 2018-08-15 DIAGNOSIS — J18.9 PNEUMONIA: Primary | ICD-10-CM

## 2018-08-28 ENCOUNTER — OFFICE VISIT - HEALTHEAST (OUTPATIENT)
Dept: FAMILY MEDICINE | Facility: CLINIC | Age: 38
End: 2018-08-28

## 2018-08-28 DIAGNOSIS — J18.9 PNEUMONIA: ICD-10-CM

## 2018-08-28 ASSESSMENT — MIFFLIN-ST. JEOR: SCORE: 2144.08

## 2018-10-01 NOTE — TELEPHONE ENCOUNTER
FUTURE VISIT INFORMATION      FUTURE VISIT INFORMATION:    Date: 10.2.18     Time: 7:00 AM    Location: [facility]  REFERRAL INFORMATION:    Referring provider:      Referring providers clinic:      Reason for visit/diagnosis  ED Follow-up pneumonia    RECORDS REQUESTED FROM:       Clinic name Comments Records Status Imaging Status   [facility]  [facility]    [facility]   [facility]  [facility]                        RECORDS STATUS      RECORDS RECEIVED FROM: [facility]   DATE RECEIVED: 10.2.18   NOTES STATUS DETAILS   OFFICE NOTE from referring provider Internal ED   OFFICE NOTE from other specialist Care Everywhere 8.28.18 [facility]  7.16.18 [facility] Bronchitis  5.31.18 [facility] Cough  5.13.18 [facility] Bronchitis   DISCHARGE SUMMARY from hospital Internal 8.7.18 [facility]   DISCHARGE REPORT from the ER Internal 8.13.18 [facility] ED   OPERATIVE REPORT N/A    MEDICATION LIST Internal    IMAGING  (NEED IMAGES AND REPORTS)     CT SCAN N/A    CHEST XRAY (CXR) Internal 8.13.18   TESTS     PULMONARY FUNCTION TESTING (PFT) In process Scheduled for 10.2.18   FLOW LOOP VOLUME (FVL) In process Scheduled for 10.2.18   CYSTIC FIBROSIS     CF SPUTUM CULTURE N/A

## 2018-10-02 ENCOUNTER — PRE VISIT (OUTPATIENT)
Dept: PULMONOLOGY | Facility: CLINIC | Age: 38
End: 2018-10-02

## 2018-10-02 ENCOUNTER — OFFICE VISIT (OUTPATIENT)
Dept: PULMONOLOGY | Facility: CLINIC | Age: 38
End: 2018-10-02
Attending: INTERNAL MEDICINE
Payer: COMMERCIAL

## 2018-10-02 VITALS
DIASTOLIC BLOOD PRESSURE: 96 MMHG | HEART RATE: 62 BPM | RESPIRATION RATE: 16 BRPM | BODY MASS INDEX: 36.52 KG/M2 | SYSTOLIC BLOOD PRESSURE: 156 MMHG | HEIGHT: 72 IN | OXYGEN SATURATION: 94 % | WEIGHT: 269.6 LBS

## 2018-10-02 DIAGNOSIS — R05.9 COUGH: Primary | ICD-10-CM

## 2018-10-02 DIAGNOSIS — J18.9 PNEUMONIA: ICD-10-CM

## 2018-10-02 ASSESSMENT — PAIN SCALES - GENERAL: PAINLEVEL: NO PAIN (0)

## 2018-10-02 NOTE — LETTER
10/2/2018       RE: Savage Bustillo  [address]  [address]  [place] [address]     Dear Colleague,    Thank you for referring your patient, Savage Bustillo, to the [facility] at [facility]. Please see a copy of my visit note below.    Reason for Visit  Savage Bustillo is a 37 year old year old male who is being seen for No chief complaint on file.    Pulmonary HPI  36 YO referred for evaluation and follow-up of pneumonia. Was hospitalized in [place] for pneumonia, hospitalized for 4-5 days, hypoxia during this admission with need for ICU admission.  Was discharged on doxcycline. Seen in ED on 8-13-18 for right sided chest discomfort and low grade temperature; this was approximately one week after discharge..  CXR was reviewed in ED with comparison to CT done in [place], significantly improved.  Was placed on Augmentin at that time.  Prior to admission to [place] he had been evaluated multiple times for cough and SOB, started on Azithromycin and then Levofloxacin with use of prednisone.  States symptoms started in April with URI type symptoms that continued and worsened/  Episodes of pneumonia at age 8 and in high school, not hospitalized either time.  No history of allergies except for mild Spring symptoms that he self manages.  No frequent episodes of bronchitis; no history of asthma.  Has had recent weight gain of 40 pounds in last couple years.  No tobacco for two months, hx of 1 ppd for 20 years. At present time his symptoms are significantly improved.    The patient was seen and examined by Darrell Mathews           Current Outpatient Prescriptions   Medication     albuterol (PROAIR HFA/PROVENTIL HFA/VENTOLIN HFA) 108 (90 Base) MCG/ACT inhaler     ALEVE 220 MG OR CAPS     DOXYCYCLINE HYCLATE PO     ipratropium - albuterol 0.5 mg/2.5 mg/3 mL (DUONEB) 0.5-2.5 (3) MG/3ML neb solution     METHADONE HCL  PO     No current facility-administered medications for this visit.      No Known Allergies  No past medical history on file.    No past surgical history on file.    Social History     Social History     Marital status:      Spouse name: N/A     Number of children: N/A     Years of education: N/A     Occupational History     Not on file.     Social History Main Topics     Smoking status: Former Smoker     Packs/day: 0.50     Types: Cigarettes     Smokeless tobacco: Current User     Types: Chew      Comment: 3 tins a week     Alcohol use No      Comment: quit 1-2xweek     Drug use: No     Sexual activity: Yes     Partners: Female     Other Topics Concern     Parent/Sibling W/ Cabg, Mi Or Angioplasty Before 65f 55m? No     Social History Narrative       FH:  Reviewed with patient.  No significant family history was identified  ROS Pulmonary  A complete ROS was otherwise negative except as noted in the HPI.  There were no vitals taken for this visit.  Exam:   GENERAL APPEARANCE: Well developed, well nourished, alert, and in no apparent distress.  EYES: PERRL, EOMI  HENT: Nasal mucosa with no edema and no hyperemia. No nasal polyps.  EARS: Canals clear, TMs normal  MOUTH: Oral mucosa is moist, without any lesions, no tonsillar enlargement, no oropharyngeal exudate.  NECK: supple, no masses, no thyromegaly.  LYMPHATICS: No significant axillary, cervical, or supraclavicular nodes.  RESP: Good air flow throughout.  No crackles. No rhonchi. Few wheezes at end expiration.  CV: Normal S1, S2, regular rhythm, normal rate. No murmur.  No rub. No gallop. No LE edema.   ABDOMEN:  Bowel sounds normal, soft, nontender, no HSM or masses.   MS: extremities normal. No clubbing. No cyanosis.  SKIN: no rash on limited exam  NEURO: Mentation intact, speech normal, normal strength and tone, normal gait and stance  PSYCH: mentation appears normal. and affect normal/bright  Results:   CXR from 8-13-18 was personally reviewed.   Minimal RLL infiltrate.  PFT's were personally reviewed in clinic  FVC 3.35 (61%), FEV-1 1.08 (44%), FEV-1/FVC 59%  FRC 91%, %, TLC 81%  DLCO 98%  Moderate airflow limitation.  Normal lung volumes. Normal diffusion capacity.  No results found for this or any previous visit (from the past 168 hour(s)).    Assessment and plan:   38 YO with recent history of pneumonia and tobacco abuse but not off cigarettes.  Symptoms were prolonged and took several courses of antibiotics for resolution. Still has moderate airflow limitation on spirometry, may be due to bronchiolitis from recent infection.  Will treat for reactive airways and assess response over the next two months.  Hopefully will only require short term inhaled steroids.    1. Start Advair 250/50 for two months  2. Albuterol prn  3. Repeat spirometry at his next visit    RTC in 2 months.  Patient to call with any questions or concerns prior to his next clinic visit.        Again, thank you for allowing me to participate in the care of your patient.      Sincerely,    Darrell Mathews MD

## 2018-10-02 NOTE — PROGRESS NOTES
Reason for Visit  Savage Bustillo is a 37 year old year old male who is being seen for No chief complaint on file.    Pulmonary HPI  36 YO referred for evaluation and follow-up of pneumonia. Was hospitalized in [place] for pneumonia, hospitalized for 4-5 days, hypoxia during this admission with need for ICU admission.  Was discharged on doxcycline. Seen in ED on 8-13-18 for right sided chest discomfort and low grade temperature; this was approximately one week after discharge..  CXR was reviewed in ED with comparison to CT done in [place], significantly improved.  Was placed on Augmentin at that time.  Prior to admission to [place] he had been evaluated multiple times for cough and SOB, started on Azithromycin and then Levofloxacin with use of prednisone.  States symptoms started in April with URI type symptoms that continued and worsened/  Episodes of pneumonia at age 8 and in high school, not hospitalized either time.  No history of allergies except for mild Spring symptoms that he self manages.  No frequent episodes of bronchitis; no history of asthma.  Has had recent weight gain of 40 pounds in last couple years.  No tobacco for two months, hx of 1 ppd for 20 years. At present time his symptoms are significantly improved.    The patient was seen and examined by Darrell Mathews           Current Outpatient Prescriptions   Medication     albuterol (PROAIR HFA/PROVENTIL HFA/VENTOLIN HFA) 108 (90 Base) MCG/ACT inhaler     ALEVE 220 MG OR CAPS     DOXYCYCLINE HYCLATE PO     ipratropium - albuterol 0.5 mg/2.5 mg/3 mL (DUONEB) 0.5-2.5 (3) MG/3ML neb solution     METHADONE HCL PO     No current facility-administered medications for this visit.      No Known Allergies  No past medical history on file.    No past surgical history on file.    Social History     Social History     Marital status:      Spouse name: N/A     Number of children: N/A     Years of education: N/A     Occupational History      Not on file.     Social History Main Topics     Smoking status: Former Smoker     Packs/day: 0.50     Types: Cigarettes     Smokeless tobacco: Current User     Types: Chew      Comment: 3 tins a week     Alcohol use No      Comment: quit 1-2xweek     Drug use: No     Sexual activity: Yes     Partners: Female     Other Topics Concern     Parent/Sibling W/ Cabg, Mi Or Angioplasty Before 65f 55m? No     Social History Narrative       FH:  Reviewed with patient.  No significant family history was identified  ROS Pulmonary  A complete ROS was otherwise negative except as noted in the HPI.  There were no vitals taken for this visit.  Exam:   GENERAL APPEARANCE: Well developed, well nourished, alert, and in no apparent distress.  EYES: PERRL, EOMI  HENT: Nasal mucosa with no edema and no hyperemia. No nasal polyps.  EARS: Canals clear, TMs normal  MOUTH: Oral mucosa is moist, without any lesions, no tonsillar enlargement, no oropharyngeal exudate.  NECK: supple, no masses, no thyromegaly.  LYMPHATICS: No significant axillary, cervical, or supraclavicular nodes.  RESP: Good air flow throughout.  No crackles. No rhonchi. Few wheezes at end expiration.  CV: Normal S1, S2, regular rhythm, normal rate. No murmur.  No rub. No gallop. No LE edema.   ABDOMEN:  Bowel sounds normal, soft, nontender, no HSM or masses.   MS: extremities normal. No clubbing. No cyanosis.  SKIN: no rash on limited exam  NEURO: Mentation intact, speech normal, normal strength and tone, normal gait and stance  PSYCH: mentation appears normal. and affect normal/bright  Results:   CXR from 8-13-18 was personally reviewed.  Minimal RLL infiltrate.  PFT's were personally reviewed in clinic  FVC 3.35 (61%), FEV-1 1.08 (44%), FEV-1/FVC 59%  FRC 91%, %, TLC 81%  DLCO 98%  Moderate airflow limitation.  Normal lung volumes. Normal diffusion capacity.  No results found for this or any previous visit (from the past 168 hour(s)).    Assessment and plan:    36 YO with recent history of pneumonia and tobacco abuse but not off cigarettes.  Symptoms were prolonged and took several courses of antibiotics for resolution. Still has moderate airflow limitation on spirometry, may be due to bronchiolitis from recent infection.  Will treat for reactive airways and assess response over the next two months.  Hopefully will only require short term inhaled steroids.    1. Start Advair 250/50 for two months  2. Albuterol prn  3. Repeat spirometry at his next visit    RTC in 2 months.  Patient to call with any questions or concerns prior to his next clinic visit.

## 2018-10-02 NOTE — MR AVS SNAPSHOT
After Visit Summary   10/2/2018    Savage Bustillo    MRN: [id number]           Patient Information     Date Of Birth          1980        Visit Information        Provider Department      10/2/2018 7:00 AM Darrell Mathews MD [facility]        Today's Diagnoses     Cough    -  1       Follow-ups after your visit        Follow-up notes from your care team     Return in about 2 months (around 12/2/2018).      Your next 10 appointments already scheduled     Dec 11, 2018  7:30 AM CST   PFT VISIT with  PFL B   [facility] Pulmonary Function Testing ([facility])    [address]  3rd Floor  [place] [id number] [phone]            Dec 11, 2018  8:00 AM CST   (Arrive by 7:45 AM)   Return Visit with Darrell Mathews MD   [facility] ([facility])    [address]  [address]  [place] [id number] [phone]              Future tests that were ordered for you today     Open Future Orders        Priority Expected Expires Ordered    General PFT Lab (Please always keep checked) Routine  10/2/2019 10/2/2018    Pulmonary Function Test Routine  10/2/2019 10/2/2018            Who to contact     If you have questions or need follow up information about today's clinic visit or your schedule please contact [facility] directly at [phone].  Normal or non-critical lab and imaging results will be communicated to you by MyChart, letter or phone within 4 business days after the clinic has received the results. If you do not hear from us within 7 days, please contact the clinic through MyChart or phone. If you have a critical or abnormal lab result, we will notify you by phone as soon as possible.  Submit refill requests through [employer] or call your pharmacy and they will forward the refill request to us. Please allow 3 business days  "for your refill to be completed.          Additional Information About Your Visit        MyChart Information     [employer] [employer] gives you secure access to your electronic health record. If you see a primary care provider, you can also send messages to your care team and make appointments. If you have questions, please call your primary care clinic.  If you do not have a primary care provider, please call [phone] and they will assist you.        Care EveryWhere ID     This is your Care EveryWhere ID. This could be used by other organizations to access your [facility] medical records  [id number]        Your Vitals Were     Pulse Respirations Height Pulse Oximetry BMI (Body Mass Index)       62 16 1.829 m (6' 0.01\") 94% 36.56 kg/m2        Blood Pressure from Last 3 Encounters:   10/02/18 (!) 156/96   08/13/18 (!) 151/98   10/09/17 158/78    Weight from Last 3 Encounters:   10/02/18 122.3 kg (269 lb 9.6 oz)   08/13/18 122.5 kg (270 lb)   10/09/17 115.9 kg (255 lb 9.6 oz)               Primary Care Provider Office Phone # Fax #    Bayron Walsh [phone] [address] [facility] [address]        Equal Access to Services     KARLY LOPEZ AH: Hadii aad ku hadasho Soomaali, waaxda luqadaha, qaybta kaalmada adeegyada, waxay idiin hayryann idalia chao la'jessica bojorquez. So [facility] [phone].    ATENCIÓN: Si habla español, tiene a dimas disposición servicios gratuitos de asistencia lingüística. Elizabeth al [phone].    We comply with applicable federal civil rights laws and [place] laws. We do not discriminate on the basis of race, color, national origin, age, disability, sex, sexual orientation, or gender identity.            Thank you!     Thank you for choosing [facility]  for your care. Our goal is always to provide you with excellent care. Hearing back from our patients is one way we can continue to improve our services. Please take a few minutes to complete the written " survey that you may receive in the mail after your visit with us. Thank you!             Your Updated Medication List - Protect others around you: Learn how to safely use, store and throw away your medicines at www.disposemymeds.org.          This list is accurate as of 10/2/18  7:29 AM.  Always use your most recent med list.                   Brand Name Dispense Instructions for use Diagnosis    albuterol 108 (90 Base) MCG/ACT inhaler    PROAIR HFA/PROVENTIL HFA/VENTOLIN HFA    1 Inhaler    Inhale 2 puffs into the lungs every 6 hours for 10 days        ALEVE 220 MG capsule   Generic drug:  naproxen sodium      1 CAPSULE EVERY 12 HOURS AS NEEDED        DOXYCYCLINE HYCLATE PO           ipratropium - albuterol 0.5 mg/2.5 mg/3 mL 0.5-2.5 (3) MG/3ML neb solution    DUONEB    30 vial    Take 1 vial (3 mLs) by nebulization every 6 hours as needed for shortness of breath / dyspnea or wheezing        METHADONE HCL PO

## 2018-10-02 NOTE — NURSING NOTE
Chief Complaint   Patient presents with     Consult     ED Follow up for Pneumonia/respiratory issues      Marlee Novak CMA

## 2018-10-15 LAB
DLCOUNC-%PRED-PRE: 98 %
DLCOUNC-PRE: 33.45 ML/MIN/MMHG
DLCOUNC-PRED: 33.94 ML/MIN/MMHG
ERV-%PRED-PRE: 39 %
ERV-PRE: 0.44 L
ERV-PRED: 1.1 L
EXPTIME-PRE: 8.58 SEC
FEF2575-%PRED-PRE: 23 %
FEF2575-PRE: 1.03 L/SEC
FEF2575-PRED: 4.31 L/SEC
FEFMAX-%PRED-PRE: 45 %
FEFMAX-PRE: 4.72 L/SEC
FEFMAX-PRED: 10.44 L/SEC
FEV1-%PRED-PRE: 44 %
FEV1-PRE: 1.98 L
FEV1FEV6-PRE: 61 %
FEV1FEV6-PRED: 82 %
FEV1FVC-PRE: 59 %
FEV1FVC-PRED: 80 %
FEV1SVC-PRE: 61 %
FEV1SVC-PRED: 78 %
FIFMAX-PRE: 6.3 L/SEC
FRCPLETH-%PRED-PRE: 91 %
FRCPLETH-PRE: 3.17 L
FRCPLETH-PRED: 3.47 L
FVC-%PRED-PRE: 61 %
FVC-PRE: 3.35 L
FVC-PRED: 5.48 L
IC-%PRED-PRE: 60 %
IC-PRE: 2.79 L
IC-PRED: 4.58 L
RVPLETH-%PRED-PRE: 139 %
RVPLETH-PRE: 2.73 L
RVPLETH-PRED: 1.96 L
TLCPLETH-%PRED-PRE: 81 %
TLCPLETH-PRE: 5.96 L
TLCPLETH-PRED: 7.33 L
VA-%PRED-PRE: 72 %
VA-PRE: 5.07 L
VC-%PRED-PRE: 56 %
VC-PRE: 3.23 L
VC-PRED: 5.68 L

## 2018-11-19 ENCOUNTER — COMMUNICATION - HEALTHEAST (OUTPATIENT)
Dept: FAMILY MEDICINE | Facility: CLINIC | Age: 38
End: 2018-11-19

## 2018-11-19 DIAGNOSIS — F17.200 NICOTINE DEPENDENCE: ICD-10-CM

## 2018-11-19 DIAGNOSIS — N52.9 ERECTILE DYSFUNCTION: ICD-10-CM

## 2018-11-21 ENCOUNTER — COMMUNICATION - HEALTHEAST (OUTPATIENT)
Dept: FAMILY MEDICINE | Facility: CLINIC | Age: 38
End: 2018-11-21

## 2018-11-21 DIAGNOSIS — F17.200 NICOTINE DEPENDENCE: ICD-10-CM

## 2018-11-21 DIAGNOSIS — N52.9 ERECTILE DYSFUNCTION: ICD-10-CM

## 2018-12-21 ENCOUNTER — APPOINTMENT (OUTPATIENT)
Dept: GENERAL RADIOLOGY | Facility: CLINIC | Age: 38
End: 2018-12-21
Attending: FAMILY MEDICINE
Payer: COMMERCIAL

## 2018-12-21 ENCOUNTER — HOSPITAL ENCOUNTER (EMERGENCY)
Facility: CLINIC | Age: 38
Discharge: HOME | End: 2018-12-21
Attending: FAMILY MEDICINE | Admitting: FAMILY MEDICINE
Payer: COMMERCIAL

## 2018-12-21 VITALS
OXYGEN SATURATION: 96 % | BODY MASS INDEX: 35.25 KG/M2 | SYSTOLIC BLOOD PRESSURE: 180 MMHG | RESPIRATION RATE: 16 BRPM | DIASTOLIC BLOOD PRESSURE: 123 MMHG | HEART RATE: 73 BPM | WEIGHT: 260 LBS | TEMPERATURE: 98.1 F

## 2018-12-21 DIAGNOSIS — S82.891A ANKLE FRACTURE, RIGHT, CLOSED, INITIAL ENCOUNTER: ICD-10-CM

## 2018-12-21 PROCEDURE — 27786 TREATMENT OF ANKLE FRACTURE: CPT | Mod: RT | Performed by: FAMILY MEDICINE

## 2018-12-21 PROCEDURE — 27786 TREATMENT OF ANKLE FRACTURE: CPT | Mod: 54 | Performed by: FAMILY MEDICINE

## 2018-12-21 PROCEDURE — 99284 EMERGENCY DEPT VISIT MOD MDM: CPT | Mod: 25 | Performed by: FAMILY MEDICINE

## 2018-12-21 PROCEDURE — 73610 X-RAY EXAM OF ANKLE: CPT | Mod: RT

## 2018-12-21 RX ORDER — TRAMADOL HYDROCHLORIDE 50 MG/1
50-100 TABLET ORAL EVERY 6 HOURS PRN
Qty: 20 TABLET | Refills: 0 | Status: SHIPPED | OUTPATIENT
Start: 2018-12-21 | End: 2019-01-20

## 2018-12-21 NOTE — DISCHARGE INSTRUCTIONS
Return to the Emergency Room if the following occurs:     Severely worsened pain, fever >101, vomiting/dehydration, or for any concern at anytime.    Or, follow-up with the following provider as we discussed:     Return to the [facility] within the next 3-5 days.    Medications discussed:    Ibuprofen 600 mg every six hours for pain (7 days duration).  Tylenol 1000 mg every six hours for pain (7 days duration).  Therefore, you can alternate these every three hours and do it safely.  No weight bearing.    If you received pain-relieving or sedating medication during your time in the ER, avoid alcohol, driving automobiles, or working with machinery.  Also, a responsible adult must stay with you.        Call the Nurse Advice Line at [phone] or [phone] for any concern at anytime.

## 2018-12-21 NOTE — ED AVS SNAPSHOT
[facility]  [address] [address]  Phone:  [phone]  Fax:  [phone]                                    Savage Bustillo   MRN: [id number]    Department:  [facility]   Date of Visit:  12/21/2018           After Visit Summary Signature Page    I have received my discharge instructions, and my questions have been answered. I have discussed any challenges I see with this plan with the nurse or doctor.    ..........................................................................................................................................  Patient/Patient Representative Signature      ..........................................................................................................................................  Patient Representative Print Name and Relationship to Patient    ..................................................               ................................................  Date                                   Time    ..........................................................................................................................................  Reviewed by Signature/Title    ...................................................              ..............................................  Date                                               Time          22EPIC Rev 08/18

## 2018-12-21 NOTE — ED PROVIDER NOTES
"HPI  The patient is a 37-year-old male presenting with an injury to his right ankle.  He denies prior surgery or hospitalization related to his right ankle.  He says, \"I may have broken this ankle but I cannot remember.\"  Other past medical history is reviewed.  Medications are reviewed.  Social history reviewed.  He is here with his wife by private car.    The patient was going down some stairs last evening when he rolled the ankle.  He was not able to get up because of pain.  He slept on the ground near where he fell.  His wife helped him into the car this morning.  He continues to complain of severe pain involving his right ankle.  No other injury reported.    ROS: All other review of systems are negative other than that noted above.     No past medical history on file.  No past surgical history on file.  No family history on file.  Social History     Tobacco Use     Smoking status: Former Smoker     Packs/day: 0.50     Types: Cigarettes     Smokeless tobacco: Current User     Types: Chew     Tobacco comment: 3 tins a week   Substance Use Topics     Alcohol use: No     Comment: quit 1-2xweek     Drug use: No         PHYSICAL  BP (!) 180/123   Pulse 73   Temp 98.1  F (36.7  C) (Temporal)   Resp 16   Wt 117.9 kg (260 lb)   SpO2 96%   BMI 35.25 kg/m    General: Patient is alert and in mild to moderate distress.  Neurological: Alert.  Moving upper and lower extremities equally, bilaterally.  Head / Neck: Atraumatic.  Ears: Not done.  Eyes: Pupils are equal, round, and reactive.  Normal conjunctiva.  Nose: Midline.  No epistaxis.  Mouth / Throat: No ulcerations or lesions.  Moist.  Respiratory: No respiratory distress. Cardiovascular: Regular rhythm.  Peripheral extremities are warm.  Abdomen / Pelvis: Not done. Genitalia: Not done.  Musculoskeletal: Obvious swelling involving the right ankle.  Tender throughout.  Skin: No evidence of rash or trauma.        ED COURSE  The patient has evidence for a lateral " malleolar fracture with subtle movement of the joint.  The patient requires splint for stability.  This is documented below.  Crutches provided.  Tramadol provided.  Follow-up with orthopedic surgery.    Splint placement for fracture.  Procedure performed by me.  Ortho-Glass stirrup and short leg splint applied with assistance from nursing and an ER tech.  No complications.    Medications - No data to display      IMPRESSION    ICD-10-CM    1. Ankle fracture, right, closed, initial encounter S82.891A Alum Crutches: Adult         Critical Care time:  none                    Williams Acuna MD  12/21/18 1007

## 2018-12-31 ENCOUNTER — COMMUNICATION - HEALTHEAST (OUTPATIENT)
Dept: FAMILY MEDICINE | Facility: CLINIC | Age: 38
End: 2018-12-31

## 2018-12-31 DIAGNOSIS — J40 BRONCHITIS: ICD-10-CM

## 2018-12-31 DIAGNOSIS — R06.2 WHEEZING: ICD-10-CM

## 2019-02-11 ENCOUNTER — OFFICE VISIT - HEALTHEAST (OUTPATIENT)
Dept: FAMILY MEDICINE | Facility: CLINIC | Age: 39
End: 2019-02-11

## 2019-02-11 DIAGNOSIS — Z09 HOSPITAL DISCHARGE FOLLOW-UP: ICD-10-CM

## 2019-02-11 DIAGNOSIS — I26.99 OTHER ACUTE PULMONARY EMBOLISM WITHOUT ACUTE COR PULMONALE (H): ICD-10-CM

## 2019-02-11 ASSESSMENT — MIFFLIN-ST. JEOR: SCORE: 2248.7

## 2019-02-27 ENCOUNTER — COMMUNICATION - HEALTHEAST (OUTPATIENT)
Dept: FAMILY MEDICINE | Facility: CLINIC | Age: 39
End: 2019-02-27

## 2019-02-27 DIAGNOSIS — N52.9 ERECTILE DYSFUNCTION: ICD-10-CM

## 2019-04-02 ENCOUNTER — COMMUNICATION - HEALTHEAST (OUTPATIENT)
Dept: FAMILY MEDICINE | Facility: CLINIC | Age: 39
End: 2019-04-02

## 2019-04-02 DIAGNOSIS — R06.02 SHORTNESS OF BREATH: ICD-10-CM

## 2019-04-03 ENCOUNTER — HOSPITAL ENCOUNTER (OUTPATIENT)
Dept: CT IMAGING | Facility: HOSPITAL | Age: 39
Discharge: HOME | End: 2019-04-03
Attending: FAMILY MEDICINE

## 2019-04-03 DIAGNOSIS — R06.02 SHORTNESS OF BREATH: ICD-10-CM

## 2019-04-15 ENCOUNTER — COMMUNICATION - HEALTHEAST (OUTPATIENT)
Dept: FAMILY MEDICINE | Facility: CLINIC | Age: 39
End: 2019-04-15

## 2019-04-15 DIAGNOSIS — F17.200 NICOTINE DEPENDENCE: ICD-10-CM

## 2019-05-31 ENCOUNTER — OFFICE VISIT - HEALTHEAST (OUTPATIENT)
Dept: FAMILY MEDICINE | Facility: CLINIC | Age: 39
End: 2019-05-31

## 2019-05-31 DIAGNOSIS — R20.0 NUMBNESS OF LEFT ANTERIOR THIGH: ICD-10-CM

## 2019-05-31 ASSESSMENT — MIFFLIN-ST. JEOR: SCORE: 2257.73

## 2019-08-15 ENCOUNTER — COMMUNICATION - HEALTHEAST (OUTPATIENT)
Dept: FAMILY MEDICINE | Facility: CLINIC | Age: 39
End: 2019-08-15

## 2019-08-15 DIAGNOSIS — F17.200 NICOTINE DEPENDENCE: ICD-10-CM

## 2019-11-08 ENCOUNTER — HEALTH MAINTENANCE LETTER (OUTPATIENT)
Age: 39
End: 2019-11-08

## 2019-11-11 NOTE — ED TRIAGE NOTES
Patient here for cough/possible pneumonia - was released from the hospital recently.  States he is having significant shortness of breath and pain with breathing.  Patient with significant other.  Patient presents ambulatory to the urgent care.     [FreeTextEntry1] : 54 y/o female, current everyday smoker for 40 years, with hx of asthma, heroin user (quit heroin on 8/3/19, currently uses methadone 55mg QD), presented to ED with SOB, cough with white sputum, wheezing, and right sided chest pain x 1 day on 9/2/19, patient was hospitalized and treated for PNA.\par \par Flex Bronch w/ BAL on 9/13/19, path of Rt BAL negative for malignant cell. \par \par PET/CT on 11/01/2019: \par - few small FDG-avid LNs: right supraclavicular measuring up to 1.2 X 0.7cm with SUV 2.2 (image 54); 1.1 x 0.9cm right paratracheal node with SUV 2.7 (image 67), subcarinal LN with SUV 3.5 (image 84), right perihilar lymph nodes. \par - RML and RLL dense consolidation with heterogeneous SUV 14.2 (image 98), with large, hypodense area of photopenia, which measures up to approximately 3.5 cm (image 95), concerning for multifocal pneumonia with abscess. \par - mildly FDG-avid infiltration of the right anterolateral chest wall at the level of the 7th-8th intercostal space (SUV 3.3; image 111).\par - mild bilateral upper lobe emphysema\par - mildly FDG-avid right pleural thickening, trace right pleural effusion\par \par I have reviewed the patient's medical records and diagnostic images at time of this office consultation and have made the following recommendation:\par 1. PET scan reviewed and explained to patient, I recommended a CT-guided FNA of the Rt lung by IR.\par 2. RTC to discuss path and next step.\par \par \par Written by Natalie Zaldivar NP, acting as a scribe for Dr. Shaan Phillips.\par \par The documentation recorded by the scribe accurately reflects the service I personally performed and the decisions made by me. SHAAN PHILLIPS MD\par

## 2019-12-12 ENCOUNTER — COMMUNICATION - HEALTHEAST (OUTPATIENT)
Dept: FAMILY MEDICINE | Facility: CLINIC | Age: 39
End: 2019-12-12

## 2019-12-12 DIAGNOSIS — F17.200 NICOTINE DEPENDENCE: ICD-10-CM

## 2019-12-14 ENCOUNTER — COMMUNICATION - HEALTHEAST (OUTPATIENT)
Dept: FAMILY MEDICINE | Facility: CLINIC | Age: 39
End: 2019-12-14

## 2019-12-14 DIAGNOSIS — N52.9 ERECTILE DYSFUNCTION: ICD-10-CM

## 2020-02-10 ENCOUNTER — OFFICE VISIT - HEALTHEAST (OUTPATIENT)
Dept: FAMILY MEDICINE | Facility: CLINIC | Age: 40
End: 2020-02-10

## 2020-02-10 DIAGNOSIS — F17.200 NICOTINE DEPENDENCE: ICD-10-CM

## 2020-02-10 DIAGNOSIS — G47.00 INSOMNIA, UNSPECIFIED TYPE: ICD-10-CM

## 2020-02-10 DIAGNOSIS — N52.9 ERECTILE DYSFUNCTION: ICD-10-CM

## 2020-02-10 DIAGNOSIS — Z00.00 ROUTINE GENERAL MEDICAL EXAMINATION AT A HEALTH CARE FACILITY: ICD-10-CM

## 2020-02-10 LAB
ALBUMIN SERPL-MCNC: 4.6 G/DL (ref 3.5–5)
ALP SERPL-CCNC: 60 U/L (ref 45–120)
ALT SERPL W P-5'-P-CCNC: 28 U/L (ref 0–45)
ANION GAP SERPL CALCULATED.3IONS-SCNC: 12 MMOL/L (ref 5–18)
AST SERPL W P-5'-P-CCNC: 24 U/L (ref 0–40)
BILIRUB SERPL-MCNC: 0.8 MG/DL (ref 0–1)
BUN SERPL-MCNC: 11 MG/DL (ref 8–22)
CALCIUM SERPL-MCNC: 10.2 MG/DL (ref 8.5–10.5)
CHLORIDE BLD-SCNC: 103 MMOL/L (ref 98–107)
CHOLEST SERPL-MCNC: 245 MG/DL
CO2 SERPL-SCNC: 27 MMOL/L (ref 22–31)
CREAT SERPL-MCNC: 0.75 MG/DL (ref 0.7–1.3)
FASTING STATUS PATIENT QL REPORTED: NO
GFR SERPL CREATININE-BSD FRML MDRD: >60 ML/MIN/1.73M2
GLUCOSE BLD-MCNC: 105 MG/DL (ref 70–125)
HDLC SERPL-MCNC: 93 MG/DL
LDLC SERPL CALC-MCNC: 124 MG/DL
POTASSIUM BLD-SCNC: 4 MMOL/L (ref 3.5–5)
PROT SERPL-MCNC: 7.8 G/DL (ref 6–8)
SODIUM SERPL-SCNC: 142 MMOL/L (ref 136–145)
TRIGL SERPL-MCNC: 138 MG/DL

## 2020-02-10 ASSESSMENT — MIFFLIN-ST. JEOR: SCORE: 2246.14

## 2020-02-11 LAB — HBA1C MFR BLD: 5.4 % (ref 4.2–6.1)

## 2020-02-26 ENCOUNTER — COMMUNICATION - HEALTHEAST (OUTPATIENT)
Dept: FAMILY MEDICINE | Facility: CLINIC | Age: 40
End: 2020-02-26

## 2020-02-26 DIAGNOSIS — G47.00 INSOMNIA, UNSPECIFIED TYPE: ICD-10-CM

## 2020-04-13 ENCOUNTER — COMMUNICATION - HEALTHEAST (OUTPATIENT)
Dept: FAMILY MEDICINE | Facility: CLINIC | Age: 40
End: 2020-04-13

## 2020-04-13 DIAGNOSIS — G47.00 INSOMNIA, UNSPECIFIED TYPE: ICD-10-CM

## 2020-05-13 ENCOUNTER — COMMUNICATION - HEALTHEAST (OUTPATIENT)
Dept: FAMILY MEDICINE | Facility: CLINIC | Age: 40
End: 2020-05-13

## 2020-05-13 DIAGNOSIS — G47.00 INSOMNIA, UNSPECIFIED TYPE: ICD-10-CM

## 2020-06-12 ENCOUNTER — COMMUNICATION - HEALTHEAST (OUTPATIENT)
Dept: FAMILY MEDICINE | Facility: CLINIC | Age: 40
End: 2020-06-12

## 2020-06-12 DIAGNOSIS — G47.00 INSOMNIA, UNSPECIFIED TYPE: ICD-10-CM

## 2020-06-29 ENCOUNTER — COMMUNICATION - HEALTHEAST (OUTPATIENT)
Dept: FAMILY MEDICINE | Facility: CLINIC | Age: 40
End: 2020-06-29

## 2020-06-29 DIAGNOSIS — J40 BRONCHITIS: ICD-10-CM

## 2020-06-29 DIAGNOSIS — R06.2 WHEEZING: ICD-10-CM

## 2020-07-15 ENCOUNTER — COMMUNICATION - HEALTHEAST (OUTPATIENT)
Dept: FAMILY MEDICINE | Facility: CLINIC | Age: 40
End: 2020-07-15

## 2020-07-15 DIAGNOSIS — G47.00 INSOMNIA, UNSPECIFIED TYPE: ICD-10-CM

## 2020-07-28 ENCOUNTER — COMMUNICATION - HEALTHEAST (OUTPATIENT)
Dept: FAMILY MEDICINE | Facility: CLINIC | Age: 40
End: 2020-07-28

## 2020-07-28 DIAGNOSIS — F17.200 NICOTINE DEPENDENCE: ICD-10-CM

## 2020-08-17 ENCOUNTER — COMMUNICATION - HEALTHEAST (OUTPATIENT)
Dept: FAMILY MEDICINE | Facility: CLINIC | Age: 40
End: 2020-08-17

## 2020-08-17 DIAGNOSIS — G47.00 INSOMNIA, UNSPECIFIED TYPE: ICD-10-CM

## 2020-09-17 ENCOUNTER — COMMUNICATION - HEALTHEAST (OUTPATIENT)
Dept: FAMILY MEDICINE | Facility: CLINIC | Age: 40
End: 2020-09-17

## 2020-09-17 DIAGNOSIS — G47.00 INSOMNIA, UNSPECIFIED TYPE: ICD-10-CM

## 2020-10-16 ENCOUNTER — OFFICE VISIT - HEALTHEAST (OUTPATIENT)
Dept: FAMILY MEDICINE | Facility: CLINIC | Age: 40
End: 2020-10-16

## 2020-10-16 DIAGNOSIS — G47.00 INSOMNIA, UNSPECIFIED TYPE: ICD-10-CM

## 2020-10-16 DIAGNOSIS — F17.200 NICOTINE DEPENDENCE: ICD-10-CM

## 2020-12-06 ENCOUNTER — HEALTH MAINTENANCE LETTER (OUTPATIENT)
Age: 40
End: 2020-12-06

## 2021-01-28 ENCOUNTER — COMMUNICATION - HEALTHEAST (OUTPATIENT)
Dept: FAMILY MEDICINE | Facility: CLINIC | Age: 41
End: 2021-01-28

## 2021-01-28 DIAGNOSIS — F17.200 NICOTINE DEPENDENCE: ICD-10-CM

## 2021-02-11 ENCOUNTER — COMMUNICATION - HEALTHEAST (OUTPATIENT)
Dept: FAMILY MEDICINE | Facility: CLINIC | Age: 41
End: 2021-02-11

## 2021-03-17 ENCOUNTER — OFFICE VISIT (OUTPATIENT)
Dept: FAMILY MEDICINE | Facility: CLINIC | Age: 41
End: 2021-03-17
Payer: COMMERCIAL

## 2021-03-17 VITALS
DIASTOLIC BLOOD PRESSURE: 78 MMHG | SYSTOLIC BLOOD PRESSURE: 124 MMHG | RESPIRATION RATE: 12 BRPM | BODY MASS INDEX: 32.92 KG/M2 | HEIGHT: 71 IN | WEIGHT: 235.13 LBS | TEMPERATURE: 97.5 F | HEART RATE: 64 BPM

## 2021-03-17 DIAGNOSIS — Z00.00 HEALTHCARE MAINTENANCE: Primary | ICD-10-CM

## 2021-03-17 DIAGNOSIS — G47.00 INSOMNIA, UNSPECIFIED TYPE: ICD-10-CM

## 2021-03-17 DIAGNOSIS — Z87.891 HX OF NICOTINE DEPENDENCE: ICD-10-CM

## 2021-03-17 DIAGNOSIS — N52.9 ERECTILE DYSFUNCTION, UNSPECIFIED ERECTILE DYSFUNCTION TYPE: ICD-10-CM

## 2021-03-17 LAB
ANION GAP SERPL CALCULATED.3IONS-SCNC: 5 MMOL/L (ref 3–14)
BUN SERPL-MCNC: 15 MG/DL (ref 7–30)
CALCIUM SERPL-MCNC: 9 MG/DL (ref 8.5–10.1)
CHLORIDE SERPL-SCNC: 107 MMOL/L (ref 94–109)
CHOLEST SERPL-MCNC: 208 MG/DL
CO2 SERPL-SCNC: 27 MMOL/L (ref 20–32)
CREAT SERPL-MCNC: 0.74 MG/DL (ref 0.66–1.25)
GFR SERPL CREATININE-BSD FRML MDRD: >90 ML/MIN/{1.73_M2}
GLUCOSE SERPL-MCNC: 104 MG/DL (ref 70–99)
HBA1C MFR BLD: 5.1 % (ref 0–5.6)
HDLC SERPL-MCNC: 98 MG/DL
LDLC SERPL CALC-MCNC: 96 MG/DL
NONHDLC SERPL-MCNC: 110 MG/DL
POTASSIUM SERPL-SCNC: 4.2 MMOL/L (ref 3.4–5.3)
SODIUM SERPL-SCNC: 139 MMOL/L (ref 133–144)
TRIGL SERPL-MCNC: 70 MG/DL

## 2021-03-17 PROCEDURE — 83036 HEMOGLOBIN GLYCOSYLATED A1C: CPT | Performed by: FAMILY MEDICINE

## 2021-03-17 PROCEDURE — 99396 PREV VISIT EST AGE 40-64: CPT | Performed by: FAMILY MEDICINE

## 2021-03-17 PROCEDURE — 80061 LIPID PANEL: CPT | Performed by: FAMILY MEDICINE

## 2021-03-17 PROCEDURE — 36415 COLL VENOUS BLD VENIPUNCTURE: CPT | Performed by: FAMILY MEDICINE

## 2021-03-17 PROCEDURE — 80048 BASIC METABOLIC PNL TOTAL CA: CPT | Performed by: FAMILY MEDICINE

## 2021-03-17 RX ORDER — VARENICLINE TARTRATE 1 MG/1
1 TABLET, FILM COATED ORAL
COMMUNITY
Start: 2021-01-30 | End: 2021-03-17

## 2021-03-17 RX ORDER — ZOLPIDEM TARTRATE 10 MG/1
10 TABLET ORAL
COMMUNITY
Start: 2020-10-16 | End: 2021-03-17

## 2021-03-17 RX ORDER — ALBUTEROL SULFATE 90 UG/1
AEROSOL, METERED RESPIRATORY (INHALATION)
COMMUNITY
Start: 2020-06-30

## 2021-03-17 RX ORDER — ZOLPIDEM TARTRATE 10 MG/1
10 TABLET ORAL
Qty: 90 TABLET | Refills: 1 | Status: SHIPPED | OUTPATIENT
Start: 2021-03-17 | End: 2021-11-02

## 2021-03-17 RX ORDER — SILDENAFIL 100 MG/1
TABLET, FILM COATED ORAL
COMMUNITY
Start: 2020-02-10 | End: 2021-03-17

## 2021-03-17 RX ORDER — SILDENAFIL 100 MG/1
TABLET, FILM COATED ORAL
Qty: 30 TABLET | Refills: 0 | Status: SHIPPED | OUTPATIENT
Start: 2021-03-17 | End: 2021-11-23

## 2021-03-17 RX ORDER — VARENICLINE TARTRATE 1 MG/1
1 TABLET, FILM COATED ORAL DAILY
Qty: 90 TABLET | Refills: 1 | Status: SHIPPED | OUTPATIENT
Start: 2021-03-17 | End: 2021-11-23

## 2021-03-17 ASSESSMENT — MIFFLIN-ST. JEOR: SCORE: 1996.52

## 2021-03-21 NOTE — PROGRESS NOTES
"Assessment/Plan:     Healthcare Maintenance Exam    Non-Fasting labs pending  Immunizations updated - declines influenza  Healthy lifestyle modifications discussed  Discussed self testicular exams      BMI:   Estimated body mass index is 32.92 kg/m  as calculated from the following:    Height as of this encounter: 1.8 m (5' 10.87\").    Weight as of this encounter: 106.7 kg (235 lb 2 oz).   Weight management plan: Discussed healthy diet and exercise guidelines          Healthcare maintenance  - Lipid panel reflex to direct LDL Non-fasting  - HEMOGLOBIN A1C  - Basic metabolic panel    Hx of nicotine dependence  He will continue the Chantix.  He has actually not been smoking for the last few years and discussed that he could certainly try weaning off of this Chantix.  He will consider this and let me know if he has any questions.  - varenicline (CHANTIX) 1 MG tablet  Dispense: 90 tablet; Refill: 1    Insomnia, unspecified type  Refill of Ambien sent to the pharmacy.  Discussed again that this is not ideally a long-term medication.  He has not needed to increase the dose but did discuss that we should consider weaning him off.  He will start trying to take half a tablet at night and let me know how things go.  - zolpidem (AMBIEN) 10 MG tablet  Dispense: 90 tablet; Refill: 1    Erectile dysfunction, unspecified erectile dysfunction type  Refill of sildenafil sent to the pharmacy.  - sildenafil (VIAGRA) 100 MG tablet  Dispense: 30 tablet; Refill: 0          Subjective:     Savage Bustillo is a 40 year old male who presents for an annual exam. Concerns are as follows:    None.  He is overall doing well.  He has a history of nicotine dependence but has not been smoking for the last few years.  He does take daily Chantix and feels as though he would like to continue doing so.    Otherwise, he is planning on becoming a bit more active now that the weather is nicer.  He has not been getting regular exercise at this " point.    Healthy Habits:   Regular Exercise: no  Sunscreen Use: yes  Healthy Diet: modeate  Dental Visits Regularly: yes  Seat Belt: yes  Sexually active: yes  Self Testicular Exam Monthly:yes  Colonoscopy: n/a  Lipid Profile: yes  Glucose Screen: yes    Immunization status: up to date.      Current Outpatient Medications   Medication Sig Dispense Refill     albuterol (VENTOLIN HFA) 108 (90 Base) MCG/ACT inhaler INHALE TWO PUFFS BY MOUTH EVERY 6 HOURS FOR 10 DAYS       sildenafil (VIAGRA) 100 MG tablet TAKE ONE-HALF TABLET BY MOUTH AS NEEDED FOR ERECTILE DYSFUNCTION 30 tablet 0     varenicline (CHANTIX) 1 MG tablet Take 1 tablet (1 mg) by mouth daily 90 tablet 1     zolpidem (AMBIEN) 10 MG tablet Take 1 tablet (10 mg) by mouth nightly as needed for sleep 90 tablet 1     No past medical history on file.  Past Surgical History:   Procedure Laterality Date     ANKLE SURGERY       Patient has no known allergies.  Family History   Problem Relation Age of Onset     No Known Problems Mother      No Known Problems Father      Social History     Socioeconomic History     Marital status:      Spouse name: Not on file     Number of children: Not on file     Years of education: Not on file     Highest education level: Not on file   Occupational History     Not on file   Social Needs     Financial resource strain: Not on file     Food insecurity     Worry: Not on file     Inability: Not on file     Transportation needs     Medical: Not on file     Non-medical: Not on file   Tobacco Use     Smoking status: Former Smoker     Packs/day: 0.50     Types: Cigarettes     Smokeless tobacco: Current User     Types: Chew     Tobacco comment: 3 tins a week   Substance and Sexual Activity     Alcohol use: No     Comment: quit 1-2xweek     Drug use: No     Sexual activity: Yes     Partners: Female   Lifestyle     Physical activity     Days per week: Not on file     Minutes per session: Not on file     Stress: Not on file  "  Relationships     Social connections     Talks on phone: Not on file     Gets together: Not on file     Attends Nondenominational service: Not on file     Active member of club or organization: Not on file     Attends meetings of clubs or organizations: Not on file     Relationship status: Not on file     Intimate partner violence     Fear of current or ex partner: Not on file     Emotionally abused: Not on file     Physically abused: Not on file     Forced sexual activity: Not on file   Other Topics Concern     Parent/sibling w/ CABG, MI or angioplasty before 65F 55M? No   Social History Narrative     Not on file       Review of Systems  General:  Denies problems  Eyes:  Denies problems  Ears/Nose/Throat:  Denies problems  Cardiovascular:  Denies problems  Respiratory:  Denies problems  Gastrointestinal:  Denies problems  Genitourinary:  Denies problems  Musculoskeletal:  Denies problems  Skin:  Denies problems  Neurologic:  Denies problems  Psychiatric:  Denies problems  Endocrine:  Denies problems  Heme/Lymphatic:  Denies problems  Allergic/Immunologic:  Denies problems      Objective:      Vitals:    03/17/21 1344   BP: 124/78   Pulse: 64   Resp: 12   Temp: 97.5  F (36.4  C)   TempSrc: Tympanic   Weight: 106.7 kg (235 lb 2 oz)   Height: 1.8 m (5' 10.87\")         Physical Exam:  General Appearance: Alert, cooperative, no distress, appears stated age  Head: Normocephalic, without obvious abnormality, atraumatic  Eyes: PERRL, conjunctiva/corneas clear, EOM's intact  Ears: Normal TM's and external ear canals, both ears   Neck: Supple, symmetrical, trachea midline, no adenopathy;  thyroid: not enlarged, symmetric, no tenderness/mass/nodules  Back: Symmetric, no curvature, ROM normal, no CVA tenderness   Lungs: Clear to auscultation bilaterally, respirations unlabored  Chest: no visible abnormalities.  Heart: Regular rate and rhythm, S1 and S2 normal, no murmur, rub, or gallop,   Abdomen: Soft, non-tender, bowel sounds " active all four quadrants,  no masses, no organomegaly  : no concerns - deferred  Extremities: Extremities normal, atraumatic, no cyanosis or edema  Skin: Skin color, texture, turgor normal, no rashes or lesions  Lymph nodes: Cervical, supraclavicular, and axillary nodes normal  Neurologic: Normal

## 2021-05-27 NOTE — TELEPHONE ENCOUNTER
RN cannot approve Refill Request    RN can NOT refill this medication PCP messaged that patient is overdue for Labs.       Jo Ann Nugent, [facility] Triage/Med Refill 4/17/2019    Requested Prescriptions   Pending Prescriptions Disp Refills     CHANTIX 1 mg tablet [Pharmacy Med Name: CHANTIX 1MG TABS] 60 tablet 2     Sig: TAKE ONE TABLET BY MOUTH TWICE A DAY       Varenicline Refill Protocol Failed - 4/15/2019  1:33 PM        Failed - Normal Serum Creatinine in past 12 months      Creatinine   Date Value Ref Range Status   10/04/2017 0.91 0.70 - 1.30 mg/dL Final             Passed - PCP or prescribing provider visit in last 12 or next 3 months.     Last office visit with prescriber/PCP: 2/11/2019 Aleah Floyd MD OR same dept: 2/11/2019 Aleah Floyd MD  Last physical: 10/6/2015       Next visit within 3 mo: Visit date not found  Next physical within 3 mo: Visit date not found  Prescriber OR PCP: Aleah Floyd MD  Last diagnosis associated with med order: 1. Nicotine dependence  - CHANTIX 1 mg tablet [Pharmacy Med Name: CHANTIX 1MG TABS]; TAKE ONE TABLET BY MOUTH TWICE A DAY  Dispense: 60 tablet; Refill: 2     Requested Prescriptions     Pending Prescriptions Disp Refills     CHANTIX 1 mg tablet [Pharmacy Med Name: CHANTIX 1MG TABS] 60 tablet 2     Sig: TAKE ONE TABLET BY MOUTH TWICE A DAY     May refill for 3 months if protocol passes.

## 2021-05-27 NOTE — TELEPHONE ENCOUNTER
Dr. Floyd-  See pt's [employer] message.  Last OV on 2-11-19 states:    He will continue on Eliquis.  I have recommended that he continue at least 3 months.  At that point we can do an ultrasound of his like to see if his blood clot has resolved.  If the clot has resolved I would feel comfortable taking him off of the Eliquis.  If not I would recommend 3 more months of the blood thinner.  He is in agreement with this plan and will contact me in about 3 months for the ultrasound order.

## 2021-05-29 NOTE — PATIENT INSTRUCTIONS - HE
- take naproxen: 440 mg (2 pills) twice daily for 14 days.  If your stomach starts to hurts with this medication, reduce the dose to 1 pill.  Discontinue if discomfort continues.  The primary goal of this therapy is to reduce inflammation.  Pain relief is often experienced but is a secondary goal.

## 2021-05-29 NOTE — PROGRESS NOTES
"Assessment/Plan:    Rajni was seen today for pain.    Diagnoses and all orders for this visit:    Numbness of left anterior thigh: The patient subjective loss of sensation localizes to the L4-L5 dermatomal distribution of the left anterior thigh.  This is in the context of recent purposeful change in behavior and effort to try to rehabilitate himself after a right lower extremity DVT and then pulmonary embolism.  His exam is essentially normal.  No red flag symptoms suggestive of spinal stenosis.  Given how mild and subjective his symptoms are I am recommending time as well as ongoing physical rehabilitation.  I did suggest he may benefit from a short course of anti-inflammatories and discussed how he could do this using the Aleve that he has at home.  Lastly, if his symptoms worsen physical therapy that may be appropriate.  There is no evidence of DVT, infection, other worrisome abnormality.  Left thigh x-ray not indicated.     Return in about 1 month (around 6/28/2019) for recheck if not improving.    Eugene Casper MD  _______________________________    Chief Complaint   Patient presents with     Pain     left thigh     Subjective: Rajni Bustillo is a 38 y.o. year old male who I have seen in clinic before who presents with the following acute complaint(s):    Left thigh pain:   - duration: 2 weeks   - \"deep pain.\"   - numbing and burning sensation   - worse when he lays down   - no weakness.  No saddle anesthesia, no urinary leakage.   - sensation is different    - no trauma   - walking for the past 3 weeks.   - broke right ankle and had DVT and PE   -     ROS: Complete review of systems obtained.  Pertinent items are listed above.     The following portions of the patient's history were reviewed and updated as appropriate: allergies, current medications, past medical history and problem list.     Objective:   /82 (Cuff Size: Adult Large)   Pulse 62   Temp 98.3  F (36.8  C) (Oral)   Resp 16  " " Ht 5' 10.98\" (1.803 m)   Wt (!) 292 lb 4.8 oz (132.6 kg)   SpO2 94%   BMI 40.79 kg/m    Gen: nad  Msk/neuro: The thoracic spine is nontender to palpation.  The lumbar spine and sacral spine are nontender to palpation.  The paraspinous muscles bilaterally are nontender to palpation.  The SI joints bilaterally nontender to palpation.  Sensation is intact throughout the legs in all dermatomal distribution including the medial thigh.  1+ reflexes at the patella bilaterally.  The patient walks with a normal gait.  Negative straight leg raise bilaterally.  The patient is able to walk on his toes and his heels without difficulty.  Strength 5/5 in lower extremities bilaterally.    No results found for this or any previous visit (from the past 24 hour(s)).  No results found.    Additional History from Old Records Summarized (2): no  Decision to Obtain Records (1): no  Radiology Tests Summarized or Ordered (1): no  Labs Reviewed or Ordered (1): no  Medicine Test Summarized or Ordered (1): no  Independent Review of EKG or X-RAY(2 each): no      This note has been dictated using voice recognition software. Any grammatical or context distortions are unintentional and inherent to the software  "

## 2021-05-30 VITALS — HEIGHT: 71 IN | BODY MASS INDEX: 35.14 KG/M2 | WEIGHT: 251 LBS

## 2021-05-31 VITALS — HEIGHT: 71 IN | WEIGHT: 244.31 LBS | BODY MASS INDEX: 34.2 KG/M2

## 2021-05-31 VITALS — BODY MASS INDEX: 32.97 KG/M2 | WEIGHT: 236.4 LBS

## 2021-05-31 VITALS — WEIGHT: 241.6 LBS | BODY MASS INDEX: 33.82 KG/M2 | HEIGHT: 71 IN

## 2021-05-31 VITALS — HEIGHT: 71 IN | BODY MASS INDEX: 35.34 KG/M2 | WEIGHT: 252.44 LBS

## 2021-05-31 VITALS — WEIGHT: 241 LBS | BODY MASS INDEX: 33.74 KG/M2 | HEIGHT: 71 IN

## 2021-05-31 VITALS — BODY MASS INDEX: 34.9 KG/M2 | HEIGHT: 71 IN | WEIGHT: 249.3 LBS

## 2021-05-31 NOTE — TELEPHONE ENCOUNTER
RN cannot approve Refill Request    RN can NOT refill this medication Protocol failed and NO refill given.      Jo Ann Nugent, [facility] Triage/Med Refill 8/15/2019    Requested Prescriptions   Pending Prescriptions Disp Refills     CHANTIX 1 mg tablet [Pharmacy Med Name: CHANTIX 1MG TABS] 60 tablet 2     Sig: TAKE ONE TABLET BY MOUTH TWICE A DAY       Varenicline Refill Protocol Failed - 8/15/2019  4:25 PM        Failed - Normal Serum Creatinine in past 12 months      Creatinine   Date Value Ref Range Status   10/04/2017 0.91 0.70 - 1.30 mg/dL Final             Passed - PCP or prescribing provider visit in last 12 or next 3 months.     Last office visit with prescriber/PCP: 2/11/2019 Aleah Floyd MD OR same dept: 2/11/2019 Aleah Floyd MD  Last physical: 10/6/2015       Next visit within 3 mo: Visit date not found  Next physical within 3 mo: Visit date not found  Prescriber OR PCP: Aleah Floyd MD  Last diagnosis associated with med order: 1. Nicotine dependence  - CHANTIX 1 mg tablet [Pharmacy Med Name: CHANTIX 1MG TABS]; TAKE ONE TABLET BY MOUTH TWICE A DAY  Dispense: 60 tablet; Refill: 2     Requested Prescriptions     Pending Prescriptions Disp Refills     CHANTIX 1 mg tablet [Pharmacy Med Name: CHANTIX 1MG TABS] 60 tablet 2     Sig: TAKE ONE TABLET BY MOUTH TWICE A DAY     May refill for 3 months if protocol passes.

## 2021-06-01 VITALS — BODY MASS INDEX: 35.78 KG/M2 | WEIGHT: 256.56 LBS

## 2021-06-01 VITALS — WEIGHT: 260.38 LBS | HEIGHT: 71 IN | BODY MASS INDEX: 36.45 KG/M2

## 2021-06-01 VITALS — BODY MASS INDEX: 36.22 KG/M2 | WEIGHT: 259.7 LBS

## 2021-06-01 VITALS — WEIGHT: 245.2 LBS | BODY MASS INDEX: 34.33 KG/M2 | HEIGHT: 71 IN

## 2021-06-01 VITALS — WEIGHT: 243.1 LBS | BODY MASS INDEX: 33.91 KG/M2

## 2021-06-02 VITALS — HEIGHT: 71 IN | BODY MASS INDEX: 37.41 KG/M2 | WEIGHT: 267.19 LBS

## 2021-06-02 VITALS — WEIGHT: 290.25 LBS | HEIGHT: 71 IN | BODY MASS INDEX: 40.64 KG/M2

## 2021-06-03 VITALS — BODY MASS INDEX: 40.92 KG/M2 | WEIGHT: 292.3 LBS | HEIGHT: 71 IN

## 2021-06-04 VITALS
SYSTOLIC BLOOD PRESSURE: 142 MMHG | HEIGHT: 71 IN | WEIGHT: 290.56 LBS | TEMPERATURE: 97.5 F | RESPIRATION RATE: 12 BRPM | HEART RATE: 88 BPM | BODY MASS INDEX: 40.68 KG/M2 | DIASTOLIC BLOOD PRESSURE: 84 MMHG

## 2021-06-04 NOTE — TELEPHONE ENCOUNTER
RN cannot approve Refill Request    RN can NOT refill this medication Protocol failed and NO refill given. Last office visit: 2/11/2019 Aleah Floyd MD Last Physical: Visit date not found Last MTM visit: Visit date not found Last visit same specialty: 5/31/2019 Eugene Casper MD.  Next visit within 3 mo: Visit date not found  Next physical within 3 mo: Visit date not found      Carrie Dunn, [facility] Connection Triage/Med Refill 12/14/2019    Requested Prescriptions   Pending Prescriptions Disp Refills     CHANTIX 1 mg tablet [Pharmacy Med Name: CHANTIX 1MG TABS] 60 tablet 2     Sig: TAKE ONE TABLET BY MOUTH TWICE A DAY       Varenicline Refill Protocol Failed - 12/12/2019  9:37 AM        Failed - Normal Serum Creatinine in past 12 months      Creatinine   Date Value Ref Range Status   10/04/2017 0.91 0.70 - 1.30 mg/dL Final             Passed - PCP or prescribing provider visit in last 12 or next 3 months.     Last office visit with prescriber/PCP: 2/11/2019 Aleah Floyd MD OR same dept: 2/11/2019 Aleah Floyd MD  Last physical: Visit date not found       Next visit within 3 mo: Visit date not found  Next physical within 3 mo: Visit date not found  Prescriber OR PCP: Aleah Floyd MD  Last diagnosis associated with med order: 1. Nicotine dependence  - CHANTIX 1 mg tablet [Pharmacy Med Name: CHANTIX 1MG TABS]; TAKE ONE TABLET BY MOUTH TWICE A DAY  Dispense: 60 tablet; Refill: 2     Requested Prescriptions     Pending Prescriptions Disp Refills     CHANTIX 1 mg tablet [Pharmacy Med Name: CHANTIX 1MG TABS] 60 tablet 2     Sig: TAKE ONE TABLET BY MOUTH TWICE A DAY     May refill for 3 months if protocol passes.

## 2021-06-04 NOTE — TELEPHONE ENCOUNTER
RN cannot approve Refill Request    RN can NOT refill this medication PCP to review. Last office visit: 2/11/2019 Aleah Floyd MD Last Physical: Visit date not found Last MTM visit: Visit date not found Last visit same specialty: 5/31/2019 Eugene Casper MD.  Next visit within 3 mo: Visit date not found  Next physical within 3 mo: Visit date not found      Carrie Dunn, [facility] Triage/Med Refill 12/14/2019    Requested Prescriptions   Pending Prescriptions Disp Refills     sildenafil (VIAGRA) 100 MG tablet 10 tablet 5     Sig: TAKE ONE-HALF TABLET BY MOUTH AS NEEDED FOR ERECTILE DYSFUNCTION       Medications for Impotence Refill Protocol Passed - 12/14/2019  2:54 AM        Passed - PCP or prescribing provider visit in last year     Last office visit with prescriber/PCP: 2/11/2019 Aleah Floyd MD OR same dept: 2/11/2019 Aleah Floyd MD OR same specialty: 5/31/2019 Eugene Casper MD  Last physical: Visit date not found Last MTM visit: Visit date not found   Next visit within 3 mo: Visit date not found  Next physical within 3 mo: Visit date not found  Prescriber OR PCP: Aleah Floyd MD  Last diagnosis associated with med order: 1. Erectile dysfunction  - sildenafil (VIAGRA) 100 MG tablet; TAKE ONE-HALF TABLET BY MOUTH AS NEEDED FOR ERECTILE DYSFUNCTION  Dispense: 10 tablet; Refill: 5    If protocol passes may refill for 12 months if within 3 months of last provider visit (or a total of 15 months).

## 2021-06-06 NOTE — PROGRESS NOTES
Assessment/Plan:        Healthcare Maintenance Exam    Fasting labs pending  Immunizations updated  Healthy lifestyle modifications discussed  Discussed self testicular exams  The following high BMI interventions were performed this visit: encouragement to exercise and weight monitoring      1. Nicotine dependence  Not smoking for >1 year - he is adjusting to being weaned off of his methadone and is hopeful to take Chantix for only 1 more month and then stop altogether.  - varenicline (CHANTIX) 1 mg tablet; Take 1 tablet (1 mg total) by mouth 2 (two) times a day.  Dispense: 60 tablet; Refill: 2    2. Erectile dysfunction  Refill given.  - sildenafil (VIAGRA) 100 MG tablet; TAKE ONE-HALF TABLET BY MOUTH AS NEEDED FOR ERECTILE DYSFUNCTION  Dispense: 30 tablet; Refill: 1    3. Insomnia, unspecified type  I will send trazodone to the pharmacy which she can try nightly for a while.  If this does not work certainly could try something a bit stronger.  I do wonder if weaning off of his methadone has caused some sleep issues.  Hopefully this will get better with time.  - traZODone (DESYREL) 100 MG tablet; Take 1 tablet (100 mg total) by mouth at bedtime.  Dispense: 90 tablet; Refill: 3    4. BMI 40.0-44.9, adult (H)  Discussed exercise and weight loss today.    5. Routine general medical examination at a health care facility  Blood pressure is slightly elevated today.  Discussed with the patient that we could certainly start some medication however he is planning on becoming a bit more physically active.  Hopefully that will help with the weight and we will not need to medicate.  - Comprehensive Metabolic Panel  - Lipid Cascade RANDOM  - Glycosylated Hemoglobin A1c           Subjective:      Rajni Brucearjun is a 39 y.o. male who presents for an annual exam. Concerns are as follows:    He is overall doing well.  He is currently on methadone and is working on weaning off of this.  He is very excited about the prospect of  being off of this medication.  He has now not been taking it for the last week or so after weaning down significantly and states that he is overall doing fairly well.    #1.  Nicotine dependence: He has not smoked for over a year and plans to stop taking Chantix at some point but would like to get off his methadone for a few months first.  He would like refills.    2.  Erectile dysfunction: Needs a refill of Viagra.    3.  He would like to discuss insomnia today.  He has been having a difficult time sleeping for the last 5 to 6 months or so.  He is unsure if this is due to the decrease of the methadone.    Healthy Habits:   Regular Exercise: No  Sunscreen Use: Yes  Healthy Diet: Yes  Dental Visits Regularly: Yes  Seat Belt: Yes  Sexually active: Yes  Self Testicular Exam Monthly:Yes  Colonoscopy: N/A  Lipid Profile: Yes  Glucose Screen: Yes      Immunization History   Administered Date(s) Administered     Tdap 09/21/2011     Immunization status: up to date and documented.      Current Outpatient Medications   Medication Sig Dispense Refill     sildenafil (VIAGRA) 100 MG tablet TAKE ONE-HALF TABLET BY MOUTH AS NEEDED FOR ERECTILE DYSFUNCTION 30 tablet 1     varenicline (CHANTIX) 1 mg tablet Take 1 tablet (1 mg total) by mouth 2 (two) times a day. 60 tablet 2     ipratropium (ATROVENT) 0.02 % nebulizer solution Take 2.5 mL (0.5 mg total) by nebulization 4 (four) times a day as needed (cough). 140 mL 0     ipratropium-albuterol (DUO-NEB) 0.5-2.5 mg/3 mL nebulizer Inhale 3 mL.       traZODone (DESYREL) 100 MG tablet Take 1 tablet (100 mg total) by mouth at bedtime. 90 tablet 3     VENTOLIN HFA 90 mcg/actuation inhaler INHALE TWO PUFFS BY MOUTH EVERY 6 HOURS FOR 10 DAYS 18 g 0     No current facility-administered medications for this visit.      No past medical history on file.  No past surgical history on file.  Patient has no known allergies.  Family History   Problem Relation Age of Onset     No Medical Problems  Mother      Cancer Father 57     Social History     Socioeconomic History     Marital status:      Spouse name: Not on file     Number of children: Not on file     Years of education: Not on file     Highest education level: Not on file   Occupational History     Not on file   Social Needs     Financial resource strain: Not on file     Food insecurity:     Worry: Not on file     Inability: Not on file     Transportation needs:     Medical: Not on file     Non-medical: Not on file   Tobacco Use     Smoking status: Former Smoker     Packs/day: 1.00     Last attempt to quit: 2018     Years since quittin.5     Smokeless tobacco: Never Used   Substance and Sexual Activity     Alcohol use: Not on file     Drug use: Not on file     Sexual activity: Not on file   Lifestyle     Physical activity:     Days per week: Not on file     Minutes per session: Not on file     Stress: Not on file   Relationships     Social connections:     Talks on phone: Not on file     Gets together: Not on file     Attends Restorationist service: Not on file     Active member of club or organization: Not on file     Attends meetings of clubs or organizations: Not on file     Relationship status: Not on file     Intimate partner violence:     Fear of current or ex partner: Not on file     Emotionally abused: Not on file     Physically abused: Not on file     Forced sexual activity: Not on file   Other Topics Concern     Not on file   Social History Narrative     Not on file       Review of Systems  General:  Denies problems  Eyes:  Denies problems  Ears/Nose/Throat:  Denies problems  Cardiovascular:  Denies problems  Respiratory:  Denies problems  Gastrointestinal:  Denies problems  Genitourinary:  Denies problems  Musculoskeletal:  Denies problems  Skin:  Denies problems  Neurologic:  Denies problems  Psychiatric:  Denies problems  Endocrine:  Denies problems  Heme/Lymphatic:  Denies problems  Allergic/Immunologic:  Denies problems    "      Objective:         Vitals:    02/10/20 1112 02/10/20 1143   BP: 148/90 142/84   Pulse: 88    Resp: 12    Temp: 97.5  F (36.4  C)    TempSrc: Oral    Weight: (!) 290 lb 9 oz (131.8 kg)    Height: 5' 10.75\" (1.797 m)        Physical Exam:  General Appearance: Alert, cooperative, no distress, appears stated age  Head: Normocephalic, without obvious abnormality, atraumatic  Eyes: PERRL, conjunctiva/corneas clear, EOM's intact  Ears: Normal TM's and external ear canals, both ears   Nose:Nares normal, septum midline,mucosa normal, no drainage   Throat:Lips, mucosa, and tongue normal; teeth and gums normal  Neck: Supple, symmetrical, trachea midline, no adenopathy;  thyroid: not enlarged, symmetric, no tenderness/mass/nodules  Back: Symmetric, no curvature, ROM normal, no CVA tenderness   Lungs: Clear to auscultation bilaterally, respirations unlabored  Chest: no visible abnormalities.  Heart: Regular rate and rhythm, S1 and S2 normal, no murmur, rub, or gallop,   Abdomen: Soft, non-tender, bowel sounds active all four quadrants,  no masses, no organomegaly  Extremities: Extremities normal, atraumatic, no cyanosis or edema  Skin: Skin color, texture, turgor normal, no rashes or lesions  Lymph nodes: Cervical, supraclavicular, and axillary nodes normal  Neurologic: Normal       "

## 2021-06-07 NOTE — TELEPHONE ENCOUNTER
Controlled Substance Refill Request  Medication Name:   Requested Prescriptions     Pending Prescriptions Disp Refills     zolpidem (AMBIEN) 10 mg tablet [Pharmacy Med Name: ZOLPIDEM TARTRATE 10MG TABS] 30 tablet 0     Sig: TAKE ONE TABLET BY MOUTH EVERY NIGHT AT BEDTIME AS NEEDED FOR SLEEP     Date Last Fill: 3/16/20  [facility]  Submit electronically to pharmacy  Controlled Substance Agreement on file:   Encounter-Level CSA Scan Date:    There are no encounter-level csa scan date.        Last office visit:  2/10/20

## 2021-06-08 NOTE — TELEPHONE ENCOUNTER
Controlled Substance Refill Request  Medication Name:   Requested Prescriptions     Pending Prescriptions Disp Refills     zolpidem (AMBIEN) 10 mg tablet [Pharmacy Med Name: ZOLPIDEM TARTRATE 10MG TABS] 30 tablet 0     Sig: TAKE ONE TABLET BY MOUTH EVERY NIGHT AT BEDTIME AS NEEDED FOR SLEEP     Date Last Fill: 4/13/20  [facility]  Submit electronically to pharmacy  Controlled Substance Agreement on file:   Encounter-Level CSA Scan Date:    There are no encounter-level csa scan date.        Last office visit:  2/10/20

## 2021-06-08 NOTE — TELEPHONE ENCOUNTER
Controlled Substance Refill Request  Medication Name:   Requested Prescriptions     Pending Prescriptions Disp Refills     zolpidem (AMBIEN) 10 mg tablet [Pharmacy Med Name: ZOLPIDEM TARTRATE 10MG TABS] 30 tablet 0     Sig: TAKE ONE TABLET BY MOUTH EVERY NIGHT AT BEDTIME AS NEEDED FOR SLEEP     Date Last Fill: 5/14/20  [facility]  Submit electronically to pharmacy  Controlled Substance Agreement on file:   Encounter-Level CSA Scan Date:    There are no encounter-level csa scan date.        Last office visit:  2/10/20

## 2021-06-09 NOTE — TELEPHONE ENCOUNTER
FYI - Status Update  Who is Calling: Patient  Update: I need this today as I am leaving town tomorrow.  Okay to leave a detailed message?:  Yes

## 2021-06-09 NOTE — TELEPHONE ENCOUNTER
Refill Approved    Rx renewed per Medication Renewal Policy. Medication was last renewed on 12/31/18.    Jo Ann Nugent, [facility] Triage/Med Refill 6/30/2020     Requested Prescriptions   Pending Prescriptions Disp Refills     VENTOLIN HFA 90 mcg/actuation inhaler [Pharmacy Med Name: VENTOLIN HFA 108MCG/ACT AERS] 18 g 0     Sig: INHALE TWO PUFFS BY MOUTH EVERY 6 HOURS FOR 10 DAYS       Albuterol/Levalbuterol Refill Protocol Passed - 6/29/2020 11:59 AM        Passed - PCP or prescribing provider visit in last year     Last office visit with prescriber/PCP: 2/11/2019 Aleah Floyd MD OR same dept: Visit date not found OR same specialty: 5/31/2019 Eugene Casper MD Last physical: 2/10/2020       Next appt within 3 mo: Visit date not found  Next physical within 3 mo: Visit date not found  Prescriber OR PCP: Aleah Floyd MD  Last diagnosis associated with med order: 1. Bronchitis  - VENTOLIN HFA 90 mcg/actuation inhaler [Pharmacy Med Name: VENTOLIN HFA 108MCG/ACT AERS]; INHALE TWO PUFFS BY MOUTH EVERY 6 HOURS FOR 10 DAYS  Dispense: 18 g; Refill: 0    2. Wheezing  - VENTOLIN HFA 90 mcg/actuation inhaler [Pharmacy Med Name: VENTOLIN HFA 108MCG/ACT AERS]; INHALE TWO PUFFS BY MOUTH EVERY 6 HOURS FOR 10 DAYS  Dispense: 18 g; Refill: 0    If protocol passes may refill for 6 months if within 3 months of last provider visit (or a total of 9 months). If patient requesting >1 inhaler per month refill x 6 months and have patient make appointment with provider.

## 2021-06-09 NOTE — TELEPHONE ENCOUNTER
Controlled Substance Refill Request  Medication Name:   Requested Prescriptions     Pending Prescriptions Disp Refills     zolpidem (AMBIEN) 10 mg tablet [Pharmacy Med Name: ZOLPIDEM TARTRATE 10MG TABS] 30 tablet 0     Sig: TAKE ONE TABLET BY MOUTH EVERY NIGHT AT BEDTIME AS NEEDED FOR SLEEP     Date Last Fill: 6/15/20  [facility]  Submit electronically to pharmacy  Controlled Substance Agreement on file:   Encounter-Level CSA Scan Date:    There are no encounter-level csa scan date.        Last office visit:  2/10/20

## 2021-06-09 NOTE — PROGRESS NOTES
Assessment/Plan:     1. Encounter for Department of Transportation (DOT) examination for [place] licence  Patient given 2 year medical examination for 's license.  Plan to follow-up with primary care provider for any other concerns  - Urinalysis        Subjective:      Savage-Sung Bustillo is a 36 y.o. male comes in today for 's license examination.  He is going to driving school right now and does not have a current 's license.  States he has no difficulty driving.  States he does have some concerns with color blindness and has had some difficulty with [employer] testing in the past but states that he has no concerns seen the colors of a light and was able to pass the basic color evaluation in office.  He wears no corrective lenses.  Does not wear hearing aids.  No significant medical problems.  He has not seen primary care since 2015 were able to review the visit note from that time.  He has no other medical concerns today.  Reviewed his past medical surgical social family history.  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    No current outpatient prescriptions on file.     No current facility-administered medications for this visit.        Past Medical History, Family History, and Social History reviewed.  No past medical history on file.  No past surgical history on file.  Review of patient's allergies indicates no known allergies.  Family History   Problem Relation Age of Onset     No Medical Problems Mother      Cancer Father 57     Social History     Social History     Marital status:      Spouse name: N/A     Number of children: N/A     Years of education: N/A     Occupational History     Not on file.     Social History Main Topics     Smoking status: Current Every Day Smoker     Packs/day: 1.00     Smokeless tobacco: Never Used     Alcohol use Not on file     Drug use: Not on file  "    Sexual activity: Not on file     Other Topics Concern     Not on file     Social History Narrative         Review of systems is as stated in HPI, and the remainder of the 10 system review is otherwise negative.    Objective:     Vitals:    03/07/17 1109   BP: 108/76   Pulse: (!) 52   SpO2: 95%   Weight: (!) 251 lb (113.9 kg)   Height: 5' 10.5\" (1.791 m)    Body mass index is 35.51 kg/(m^2).     Visual Acuity Screening    Right eye Left eye Both eyes   Without correction: 20/20 20/25 20/16   With correction:       visual field examination is normal at 110   Patient is able to distinguish between red green and levi  Whisper test normal at greater than 5 feet.    General Appearance:    Alert, cooperative, no distress, appears stated age   Head:    Normocephalic, without obvious abnormality, atraumatic   Eyes:    PERRL, EOM's intact   Ears:    Normal TM's and external ear canals   Nose:   Mucosa normal, no drainage     or sinus tenderness   Throat:   Oropharynx is clear   Neck:   Supple, symmetrical, no adenopathy, no thyromegally       Lungs:     Clear to auscultation bilaterally, respirations unlabored   Chest Wall:    No tenderness or deformity    Heart:    Regular rate and rhythm, S1 and S2 normal, no murmur, rub    or gallop       Abdomen:     Soft, non-tender, bowel sounds active all four quadrants,     no masses, no organomegaly           Extremities:   Extremities normal, atraumatic, no cyanosis or edema normal muscular strength    Pulses:   2+ and symmetric all extremities   Skin:   No rashes or lesions         This note has been dictated using voice recognition software. Any grammatical or context distortions are unintentional and inherent to the the software.   "

## 2021-06-10 NOTE — TELEPHONE ENCOUNTER
Controlled Substance Refill Request  Medication Name:   Requested Prescriptions     Pending Prescriptions Disp Refills     zolpidem (AMBIEN) 10 mg tablet 30 tablet 0     Sig: Take 1 tablet (10 mg total) by mouth at bedtime as needed for sleep.     Date Last Fill: 7/15/2020  [facility]  Submit electronically to pharmacy  Controlled Substance Agreement on file:   Encounter-Level CSA Scan Date:    There are no encounter-level csa scan date.        Last office visit:  2/10/2020

## 2021-06-10 NOTE — TELEPHONE ENCOUNTER
Refill Approved    Rx renewed per Medication Renewal Policy. Medication was last renewed on 2/10/20.    Jo Ann Nugent, [facility] Triage/Med Refill 7/30/2020     Requested Prescriptions   Pending Prescriptions Disp Refills     CHANTIX 1 mg tablet [Pharmacy Med Name: CHANTIX 1MG TABS] 60 tablet 2     Sig: TAKE ONE TABLET BY MOUTH TWICE A DAY       Varenicline Refill Protocol Passed - 7/28/2020  1:12 PM        Passed - Normal Serum Creatinine in past 12 months      Creatinine   Date Value Ref Range Status   02/10/2020 0.75 0.70 - 1.30 mg/dL Final             Passed - PCP or prescribing provider visit in last 12 or next 3 months.     Last office visit with prescriber/PCP: 2/11/2019 Aleah Floyd MD OR same dept: Visit date not found  Last physical: 2/10/2020       Next visit within 3 mo: Visit date not found  Next physical within 3 mo: Visit date not found  Prescriber OR PCP: Aleah Floyd MD  Last diagnosis associated with med order: 1. Nicotine dependence  - CHANTIX 1 mg tablet [Pharmacy Med Name: CHANTIX 1MG TABS]; TAKE ONE TABLET BY MOUTH TWICE A DAY  Dispense: 60 tablet; Refill: 2     Requested Prescriptions     Pending Prescriptions Disp Refills     CHANTIX 1 mg tablet [Pharmacy Med Name: CHANTIX 1MG TABS] 60 tablet 2     Sig: TAKE ONE TABLET BY MOUTH TWICE A DAY     May refill for 3 months if protocol passes.

## 2021-06-11 NOTE — TELEPHONE ENCOUNTER
Controlled Substance Refill Request  Medication Name:   Requested Prescriptions     Pending Prescriptions Disp Refills     zolpidem (AMBIEN) 10 mg tablet 30 tablet 0     Sig: Take 1 tablet (10 mg total) by mouth at bedtime as needed for sleep.     Date Last Fill: 8/17/20  [facility]  Submit electronically to pharmacy  Controlled Substance Agreement on file:   Encounter-Level CSA Scan Date:    There are no encounter-level csa scan date.        Last office visit:  Px 2/10/20    Good Afternoon,  I am going to be in town over the next 2 hours, hoping i can get my Ambien refilled by then.  I thought i sent the request yesterday, but didn't finish the process.     Thanks

## 2021-06-11 NOTE — TELEPHONE ENCOUNTER
Duplicate refill request.  See refill encounter from today. Has been sent to Dr. Floyd for her approval.

## 2021-06-12 NOTE — PROGRESS NOTES
"Rajni Bustillo is a 39 y.o. male who is being evaluated via a billable video visit.      The patient has been notified of following:     \"This video visit will be conducted via a call between you and your physician/provider. We have found that certain health care needs can be provided without the need for an in-person physical exam.  This service lets us provide the care you need with a video conversation.  If a prescription is necessary we can send it directly to your pharmacy.  If lab work is needed we can place an order for that and you can then stop by our lab to have the test done at a later time.    Video visits are billed at different rates depending on your insurance coverage. Please reach out to your insurance provider with any questions.    If during the course of the call the physician/provider feels a video visit is not appropriate, you will not be charged for this service.\"    Patient has given verbal consent to a Video visit? Yes  How would you like to obtain your AVS? AVS Preference: MyChart.  If dropped by the video visit, the video invitation should be sent to: Text to cell phone: [phone]  Will anyone else be joining your video visit? No        Video Start Time: 1:43 PM    -------------------------    Assessment/Plan:    Rajni Bustillo is a 39 y.o. male presenting for:    1. Insomnia, unspecified type  Refill of Ambien sent to the pharmacy.  Discussed with the patient that it might be a good idea to try cutting this back to 1/2 tablet daily.  He will contact me if he has any further questions or concerns.  - zolpidem (AMBIEN) 10 mg tablet; Take 1 tablet (10 mg total) by mouth at bedtime as needed for sleep.  Dispense: 90 tablet; Refill: 1    2. Nicotine dependence  Chantix sent to the pharmacy.  Discussed risks and benefits of the medication.  He has been doing well with this.  He has been now nicotine free for about a year and a half.  He continues this medication for the next few " months.  - varenicline (CHANTIX) 1 mg tablet; Take 1 tablet (1 mg total) by mouth 2 (two) times a day.  Dispense: 60 tablet; Refill: 2    Follow-up in 6 months for a complete physical exam.    Medications Discontinued During This Encounter   Medication Reason     traZODone (DESYREL) 100 MG tablet Therapy completed           Chief Complaint:  Chief Complaint   Patient presents with     Medication Education Visit     Med check     Medication Refill     Pended for approval if ok       Subjective:   Rajni Bustillo is a pleasant 39 y.o. male being evaluated via video visit today for the following concern/s:     Medication check    Insomnia: Patient has a past medical history significant for insomnia.  He is currently using Ambien 10 mg at night.  He states that it does help him sleep.  When he takes half a tablet he states that he will still sleep but not quite as well.  He is hopeful to get some refills.    Smoking cessation: Patient has now been nicotine free for about a year and a half.  He is taking Chantix.  He would like to continue doing this for the next 1 to 2 months and then wean off.  He would like refills today.    Otherwise, he is overall doing very well.  He has lost about 60 pounds per his report with diet and exercise.      12 point review of systems completed and negative except for what has been described above    Social History     Tobacco Use   Smoking Status Former Smoker     Packs/day: 1.00     Quit date: 2018     Years since quittin.1   Smokeless Tobacco Never Used       Current Outpatient Medications   Medication Sig Note     CHANTIX 1 mg tablet TAKE ONE TABLET BY MOUTH TWICE A DAY      ipratropium-albuterol (DUO-NEB) 0.5-2.5 mg/3 mL nebulizer Inhale 3 mL. 2018: Received from: [place] Received Sig: Take 1 vial (3 mLs) by nebulization every 6 hours as needed for shortness of breath / dyspnea or wheezing     sildenafil (VIAGRA) 100 MG tablet TAKE ONE-HALF TABLET BY MOUTH AS  NEEDED FOR ERECTILE DYSFUNCTION      VENTOLIN HFA 90 mcg/actuation inhaler INHALE TWO PUFFS BY MOUTH EVERY 6 HOURS FOR 10 DAYS      zolpidem (AMBIEN) 10 mg tablet Take 1 tablet (10 mg total) by mouth at bedtime as needed for sleep.      zolpidem (AMBIEN) 10 mg tablet Take 1 tablet (10 mg total) by mouth at bedtime as needed for sleep.      ipratropium (ATROVENT) 0.02 % nebulizer solution Take 2.5 mL (0.5 mg total) by nebulization 4 (four) times a day as needed (cough).          Objective:  No flowsheet data found.        There is no height or weight on file to calculate BMI.    General: No acute distress  Psych: Appropriate affect  HEENT: moist mucous membranes  Pulmonary: Breathing comfortably, speaking in complete sentences  Extremities: warm and well perfused with no edema  Skin: warm and dry with no rash       This note has been dictated and transcribed using voice recognition software.   Any errors in transcription are unintentional and inherent to the software.      Video-Visit Details    Type of service:  Video Visit    Video End Time (time video stopped): 1:58 PM  Originating Location (pt. Location): Home    Distant Location (provider location):  [facility]     Platform used for Video Visit: Chaparro Floyd MD

## 2021-06-13 NOTE — PROGRESS NOTES
Assessment/Plan:    Rajni Bustillo is a 36 y.o. male presenting for:    1. Nicotine dependence  We discussed nicotine dependence and quitting smoking.  He will use Chantix as he is used in the past and had good success with that.  We discussed some lifestyle modifications he can make as well.  - varenicline (CHANTIX STARTING MONTH BOX) 0.5 mg (11)- 1 mg (42) tablet; 1 wk before you stop smoking take 0.5mg daily on days 1-3, 0.5mg 2 times each day on days 4-7, then 1mg 2 times daily.  Dispense: 53 tablet; Refill: 0    2. Weight gain  He is fairly disappointed that I cannot prescribe any phentermine to him today.  I did discuss that he certainly could see the weight loss clinic in [place] although I do not know that it would be covered by insurance given that he does not have any pre-existing diagnoses that I am aware of.  We will check some basic laboratory tests today as well.  I did discuss that if his blood sugars are somewhat borderline we could consider metformin which can be used as a weight loss medication as well but he is not interested at that at this point.  - Ambulatory referral for Weight Management: [place]  - Thyroid [place]    3. Healthcare maintenance  - Thyroid [facility]  - Lipid [facility] RANDOM  - Glycosylated Hemoglobin A1c  - Basic Metabolic Panel    4. Erectile dysfunction  The patient I did not really discuss this today.  He actually called in later requesting Viagra.  He has used this in the past.  Note was sent back to him discussing how to use the medication as well as the risks and common side effects.  - sildenafil (VIAGRA) 100 MG tablet; Take 0.5 tablets (50 mg total) by mouth as needed for erectile dysfunction.  Dispense: 10 tablet; Refill: 0        There are no discontinued medications.        Chief Complaint:  Chief Complaint   Patient presents with     Weight Loss     Discuss weight loss     Medication Education Visit     Discuss starting chantix to help stop smoking  "      Subjective:   Rajni Bustillo is a 36-year-old gentleman presenting to the clinic today in hopes to get a weight loss medication.  The patient states that over the past several years he has gained about 30 pounds.  He states that he is uncomfortable with this.  He states that he works out several times a week both with cardio and weightlifting.  He is a bit evasive about his diet and states that he does not count his calories.  He states in general he eats fairly healthfully and does not eat any fast food.  He is discouraged that he is not losing weight.  In general he has been able to lose weight quickly in the past.    He also would like to discuss quitting smoking today.  He smokes approximately 1 pack per day.  He has quit with Chantix in the past and is hopeful to do so again.  He was hoping to get a prescription today.  He is tolerated the medication well in the past.    12 point review of systems completed and negative except for what has been described above    History   Smoking Status     Current Every Day Smoker     Packs/day: 1.00   Smokeless Tobacco     Never Used       Current Outpatient Prescriptions   Medication Sig     sildenafil (VIAGRA) 100 MG tablet Take 0.5 tablets (50 mg total) by mouth as needed for erectile dysfunction.     varenicline (CHANTIX STARTING MONTH BOX) 0.5 mg (11)- 1 mg (42) tablet 1 wk before you stop smoking take 0.5mg daily on days 1-3, 0.5mg 2 times each day on days 4-7, then 1mg 2 times daily.         Objective:  Vitals:    10/04/17 1433   BP: 118/70   Pulse: 64   Resp: 16   Temp: 97.5  F (36.4  C)   TempSrc: Oral   Weight: (!) 252 lb 7 oz (114.5 kg)   Height: 5' 10.5\" (1.791 m)       Vital signs reviewed and stable  General: No acute distress  Psych: Appropriate affect  HEENT: moist mucous membranes, pupils equal, round, reactive to light and accomodation, posterior oropharynx clear of erythema or exudate, tympanic membranes are pearly grey bilaterally  Lymph: no " cervical or supraclavicular lymphadenopathy  Cardiovascular: regular rate and rhythm with no murmur  Pulmonary: clear to auscultation bilaterally with no wheeze  Abdomen: soft, non tender, non distended with normo-active bowel sounds  Extremities: warm and well perfused with no edema  Skin: warm and dry with no rash         This note has been dictated and transcribed using voice recognition software.   Any errors in transcription are unintentional and inherent to the software.

## 2021-06-13 NOTE — PROGRESS NOTES
PATIENT INTAKE      ASSESSMENT:    1. Class 1 obesity due to excess calories with serious comorbidity and body mass index (BMI) of 34.0 to 34.9 in adult     2. Foot pain, bilateral  Ambulatory referral to Podiatry   3. BMI 34.0-34.9,adult           PLAN    The patient attended group visit to learn about obesity as a disease, an approach to treatment and metabolic factors.  Nutrition counseling reviewed.    The patient had fasting lab work performed recently by her primary and all of those labs were reviewed today.    Body composition analyzer done and results reviewed with the patient.  Please see scanned results in chart.      Rx for phentermine given and discussed risks, benefits, potential side effects, and when to take the medication.  All questions were answered.    I placed a referral to podiatry as he is experiencing some foot pain that limits his ability to exercise fully.    Recommend journaling and tracking food intake using either an online program such as myfitnesspal.com or loseit.com or tracking using a paper and pencil.  Advised paying particular attention to total carbohydrates, fiber, protein, calories, and fats, and added sugars.     Recommend increasing movement throughout the day--sitting less, moving more.  Will increase activity over time to reach a goal of at least 150 minutes of moderate exercise each week.    Behavior modification:  Cognitive restructuring exercises, journaling stressors, triggers for food cravings.    Dietary Intervention:  Reduced calorie, reduced carbohydrates, whole food diet.    Greater than 50% of this 45 minute visit was spent in counseling regarding obesity is a disease as well as the nutritional and exercise recommendations of our program as it pertains to the patient's own individual healthcare needs.    Patient instructed to follow up in 1 month.      This note has been dictated using voice recognition software. Any grammatical or context distortions are  unintentional and inherent to the software      SUBJECTIVE:      Patient presents for treatment of chronic, comorbid conditions using intensive therapeutic lifestyle interventions including nutrition, physical activity, and behavior management.  The patient states that he is struggling to maintain a healthy weight.  He has successfully lost weight in the past but is gradually sliding back and wants some help with this.  He is motivated to lose weight.  The patient is working on overall getting healthier.  He saw his primary care provider recently and was prescribed Chantix and has quit smoking successfully in the recent past.  He has a family history of hypertension in both of his parents but no family history for diabetes or obesity.  He does like to exercise.  He is  and has 2 kids.  He does not tend to skip meals and eats 3-4 times per day.  He finds that being too busy in life is his greatest barrier to eating healthy foods.  He does not tend to eat out often.  The patient successfully lost a good amount of weight in high school with the help of weight loss medication and then states that he was successful at maintaining that weight throughout his 20s.  He finds that having sweets around the home is 1 of his challenges.    What would you like to weigh (goal weight):  200  At what age were you last at that weight: 31  Any previous prescription weight loss medication:  Yes; Phentermine  Number of children:  2  Do you exercise regularly:  No  Any problems with exercise:  Yes; foot pain  Do you eat nutritiously?  yes  Do you eat excessively?  yes  Do you count calories?  no  Do you snore at night or ever stop breathing? yes  Have you been overweight all your life?  yes  If not, how long?  n/a  Do you have a history of eating disorder?  No  Have you ever had or been treated for alcohol or other substance abuse/dependence:   No    Patient Active Problem List   Diagnosis     BMI 34.0-34.9,adult     Class 1  obesity due to excess calories with serious comorbidity in adult       No past medical history on file.    No past surgical history on file.    Current Outpatient Prescriptions on File Prior to Visit   Medication Sig Dispense Refill     sildenafil (VIAGRA) 100 MG tablet Take 0.5 tablets (50 mg total) by mouth as needed for erectile dysfunction. 10 tablet 0     No current facility-administered medications on file prior to visit.        No Known Allergies      Family History   Problem Relation Age of Onset     No Medical Problems Mother      Cancer Father 57     Family History also positive for  Hypertension    Social History     Social History     Marital status:      Spouse name: N/A     Number of children: N/A     Years of education: N/A     Social History Main Topics     Smoking status: Current Every Day Smoker     Packs/day: 1.00     Smokeless tobacco: Never Used     Alcohol use None     Drug use: None     Sexual activity: Not Asked     Other Topics Concern     None     Social History Narrative         ROS  A comprehensive review of systems was negative.  Pertinent items are noted in HPI.  Patient denies chest pain or pressure, shortness of breath, exertional intolerance, palpitations, or lightheadedness.      Vitals:    11/03/17 0700   BP: 98/64   Pulse: 68     Initial Weight: 249.3 lbs  Weight: 249 lb 4.8 oz (113.1 kg)  Weight loss from initial: 0  % Weight loss: 0 %  Body Fat %: 40.9  Waist Circumference: 46 inches  Neck Circumference: 16.5 inches  Body mass index is 34.77 kg/(m^2).    EXAM                    General   Appearance:  Alert, pleasant, cooperative, no distress, appears stated age, patient is obese   Neck:   Supple, symmetrical, trachea midline, no adenopathy;     thyroid:  no enlargement/tenderness/nodules   Lungs:     Clear to auscultation bilaterally without wheezes, rales, or rhonchi, respirations unlabored    Heart:    Regular rate and rhythm, S1 and S2 normal, no murmur, rub   or  gallop                                 Abdomen:  Soft, nontender, nondistended. No hepatosplenomegaly.          Extremities:  Warm, well perfused, no edema.           Skin:  Skin color, texture, and turgor normal.  No skin tags, striae, hirsutism, or acanthosis nigricans    Body composition analyzer done and results reviewed with the patient.  Please see scanned results in chart.  Labs ordered and will review and discuss with the patient.

## 2021-06-14 NOTE — PROGRESS NOTES
Assessment/Plan:    Rajni Bustillo is a 36 y.o. male presenting for:    Muscle sprain: I discussed this with the patient.  Certainly I am happy to order x-rays however given that he is really not having any pain with any sort of examination maneuver I do not think that x-rays are necessary.  He is in agreement with this plan and will sign up for my chart.  He will let me know how things are going in the next week or 2.  Certainly if he continues to have pain I am happy to do an x-ray either of his foot or knee.      There are no discontinued medications.        Chief Complaint:  Chief Complaint   Patient presents with     Knee Injury     R knee and ankle- fell down a little flight of stairs in the dark tripping on daughters stool- happened 2wks ago - still not feeling better     Ankle Injury       Subjective:   Rajni Bustillo is a pleasant 36-year-old gentleman presenting to the clinic today for concerns over right-sided knee ankle and foot pain.  About 2 weeks ago the patient was walking down his stairs at home at night.  He unfortunately tripped over 1 of his daughters toys and fell onto his right side.  He states he hit his knee and twisted his right ankle.  He actually had some fairly significant swelling and was limping for a few days and actually using crutches.  Since that time things have been slowly improving every day.  He did not ever have any bruising although he did have some swelling in the areas.  He still has some knee pain which is more in the medial aspect of his knee as well as some pain on the top of his foot.  He is able to walk without crutches although he is limping a little bit still.    12 point review of systems completed and negative except for what has been described above    History   Smoking Status     Light Tobacco Smoker     Packs/day: 1.00   Smokeless Tobacco     Never Used       Current Outpatient Prescriptions   Medication Sig     phentermine (ADIPEX-P) 37.5 mg tablet  "Take 1/2 tablet PO twice daily     sildenafil (VIAGRA) 100 MG tablet Take 0.5 tablets (50 mg total) by mouth as needed for erectile dysfunction.     varenicline (CHANTIX) 1 mg tablet Take 1 tablet (1 mg total) by mouth 2 (two) times a day.         Objective:  Vitals:    11/17/17 1003   BP: 122/80   Pulse: 64   Resp: 12   Temp: 97.8  F (36.6  C)   TempSrc: Oral   Weight: (!) 244 lb 5 oz (110.8 kg)   Height: 5' 11\" (1.803 m)       Vital signs reviewed and stable  General: No acute distress  Psych: Appropriate affect  HEENT: moist mucous membranes  Extremities: warm and well perfused with no edema  Skin: warm and dry with no rash  Muscular skeletal: No tenderness to palpation of ankle or foot and is able to move all toes and ankle in every direction.  No tenderness or pain with flexion or extension against resistance of ankle.  Provocative maneuvers of knee are unremarkable.  No swelling or tenderness to palpation.       This note has been dictated and transcribed using voice recognition software.   Any errors in transcription are unintentional and inherent to the software.  "

## 2021-06-14 NOTE — PROGRESS NOTES
ASSESSMENT:  1. Class 1 obesity due to excess calories with serious comorbidity and body mass index (BMI) of 34.0 to 34.9 in adult     2. BMI 34.0-34.9,adult           PLAN:  Follow up in 1 month.  Continue medications.  Continue supplements.  Doing well with phentermine, refill given today.  Recommend increasing movement throughout the day--sitting less, moving more.  Will increase activity over time to reach a goal of at least 150 minutes of moderate exercise each week.  Behavior modification:  Cognitive restructuring exercises, journaling stressors, triggers for food cravings.  Dietary Intervention:  Reduced calorie, reduced carbohydrates, whole food diet.  Greater than 50% of this 15 minute visit was spent in counseling regarding patient's obesity, medications, dietary, exercise, and behavior modification recommendations.      SUBJECTIVE  Patient presents for treatment of chronic, comorbid conditions using intensive therapeutic lifestyle interventions including nutrition, physical activity, and behavior management.  The patient comes in today for his first month follow-up after initiation of phentermine and a nutrition plan of an adequate protein low carbohydrate diet.  The patient states that he feels he did reasonably well this month.  Unfortunately, the patient did injure his right leg tripping on his child's toy at home which is made it more difficult to be physically active since that time.  He feels he is done a good job of getting in enough protein and limiting his carbohydrates although states that he did not journal at all this month.  He tolerates the phentermine well without any side effects and he feels that it does provide benefit to help him keep on track.    Are you experiencing any side effects to the medications:  No  Hunger control:  good  Exercise was discussed: yes; active at work but injured right leg tripping on child's toy  Taking supplements:  yes  Discussed journaling food:  Yes; did not  participate  Patient is pleased with the current results:  yes  The patient is following the nutrition plan:  yes  Barriers to losing weight: none identified    Patient Active Problem List   Diagnosis     BMI 34.0-34.9,adult     Class 1 obesity due to excess calories with serious comorbidity in adult       Current Outpatient Prescriptions on File Prior to Visit   Medication Sig Dispense Refill     sildenafil (VIAGRA) 100 MG tablet Take 0.5 tablets (50 mg total) by mouth as needed for erectile dysfunction. 10 tablet 0     varenicline (CHANTIX) 1 mg tablet Take 1 tablet (1 mg total) by mouth 2 (two) times a day. 60 tablet 2     No current facility-administered medications on file prior to visit.        The following portions of the patient's history were reviewed and updated as appropriate: allergies, current medications, past family history, past medical history, past social history, past surgical history and problem list.    Review of Systems  Pertinent items are noted in HPI.        OBJECTIVE:  Vitals:    12/04/17 1500   BP: 110/62   Pulse: 60     Initial Weight: 249.3 lbs  Weight: 241 lb (109.3 kg)  Weight loss from initial: 7.7  % Weight loss: 3.09 %  Body mass index is 33.7 kg/(m^2).  General:  Patient is alert, pleasant, no distress.  Patient is obese.       .  This note has been dictated using voice recognition software. Any grammatical or context distortions are unintentional and inherent to the software

## 2021-06-14 NOTE — PROGRESS NOTES
"ASSESSMENT: Callus, bunionette    PLAN: Calluses debrided down today, relieving pain.  No underlying infection or wart.  We talked about orthotics to offload the painful areas.  He will pursue moisturizing and filing at home to minimize callus regrowth.  Return to clinic as needed.      SUBJECTIVE: New patient visit at the [facility] regarding pain around the fifth metatarsal heads bilaterally.  Calluses have grown there, and these are painful with shoes and walking.  Some improvement with filing at home, but the calluses quickly return.  He denies bleeding or open wound.  Recent sprain of the right foot, but that seems to be healing.  Callus pain is older than the injury.    PHYSICAL EXAM:  Pulse (!) 58  Resp 14  Ht 5' 11\" (1.803 m)  Wt (!) 241 lb (109.3 kg)  SpO2 93%  BMI 33.61 kg/m2  General: Pleasant 36 y.o. male in no acute distress.  Vascular: DP pulses are palpable. PT pulses are palpable. Pedal hair is present. Feet are warm to the touch.  Cardiac: Pulse is regular.  Lymphatic: No edema at the ankles.  Neuro: Sensation in the feet is grossly intact to light touch.  Derm: Painful calluses lateral to the fifth metatarsal heads bilaterally.  These were debrided down to intact skin.  Musculoskeletal: Bilateral bunionette deformity.  Otherwise adequate motion in foot and ankle joints.    History reviewed. No pertinent past medical history.    Past surgical history is negative for foot procedures.    No Known Allergies    Current Outpatient Prescriptions   Medication Sig Dispense Refill     phentermine (ADIPEX-P) 37.5 mg tablet Take 1/2 tablet PO twice daily 30 tablet 0     sildenafil (VIAGRA) 100 MG tablet Take 0.5 tablets (50 mg total) by mouth as needed for erectile dysfunction. 10 tablet 0     varenicline (CHANTIX) 1 mg tablet Take 1 tablet (1 mg total) by mouth 2 (two) times a day. 60 tablet 2     No current facility-administered medications for this visit.        Family History:  family history " includes Cancer (age of onset: 57) in his father; No Medical Problems in his mother.    Social History:  Reviewed, and he reports that he has been smoking.  He has been smoking about 1.00 pack per day. He has never used smokeless tobacco.    Review of Systems:  A 12 point comprehensive review of systems was negative except as noted.

## 2021-06-14 NOTE — TELEPHONE ENCOUNTER
Refill Approved    Rx renewed per Medication Renewal Policy. Medication was last renewed on 10/16/20, last OV 10/16/20.    Aubree Ford, [facility] Triage/Med Refill 1/30/2021     Requested Prescriptions   Pending Prescriptions Disp Refills     CHANTIX 1 mg tablet [Pharmacy Med Name: CHANTIX 1MG TABS] 60 tablet 2     Sig: TAKE ONE TABLET BY MOUTH TWICE A DAY       Varenicline Refill Protocol Passed - 1/28/2021  5:02 AM        Passed - Normal Serum Creatinine in past 12 months      Creatinine   Date Value Ref Range Status   02/10/2020 0.75 0.70 - 1.30 mg/dL Final             Passed - PCP or prescribing provider visit in last 12 or next 3 months.     Last office visit with prescriber/PCP: 2/11/2019 Aleah Floyd MD OR same dept: Visit date not found  Last physical: 2/10/2020       Next visit within 3 mo: Visit date not found  Next physical within 3 mo: Visit date not found  Prescriber OR PCP: Aleah Floyd MD  Last diagnosis associated with med order: 1. Nicotine dependence  - CHANTIX 1 mg tablet [Pharmacy Med Name: CHANTIX 1MG TABS]; TAKE ONE TABLET BY MOUTH TWICE A DAY  Dispense: 60 tablet; Refill: 2     Requested Prescriptions     Pending Prescriptions Disp Refills     CHANTIX 1 mg tablet [Pharmacy Med Name: CHANTIX 1MG TABS] 60 tablet 2     Sig: TAKE ONE TABLET BY MOUTH TWICE A DAY     May refill for 3 months if protocol passes.

## 2021-06-15 NOTE — PROGRESS NOTES
"Assessment and Plan:     Class 1 obesity due to excess calories with serious comorbidity in adult  37 y.o. year old male in clinic today to discuss treatment of the following conditions through diet and lifestyle modification and weight loss:  1. BMI 34.0-34.9,adult    2. Class 1 obesity due to excess calories with serious comorbidity and body mass index (BMI) of 34.0 to 34.9 in adult    3. Hypertriglyceridemia      The patient's efforts this past month were unsuccessful as evidenced by weight gain.  The patient had some indiscretions over the past month, in particular when he traveled to [place].  He has been eating at restaurants more often.  -We discussed macronutrient targets and I did recommend the patient track if only to learn more about the carbohydrate content of various forms of food.  I also recommend tracking to confirm that he is getting adequate nutrition as his description of certain food choices on certain days is unlikely to add up to [id number] brady.  -Discussed planning and tracking.  -Continue phentermine.    -Return to clinic in 1 month.      Follow up in 1 month.  Continue supplements.    Recommend increasing movement throughout the day--sitting less, moving more.  Will increase activity over time to reach a goal of at least 150 minutes of moderate exercise each week.  Behavior modification:  Cognitive restructuring exercises, journaling stressors, triggers for food cravings.  Dietary Intervention:  Reduced calorie, reduced carbohydrates, whole food diet.  Greater than 50% of this 15 minute visit was spent in counseling regarding patient's obesity, medications, dietary, exercise, and behavior modification recommendations.      Subjective  Patient presents for treatment of chronic, comorbid conditions using intensive therapeutic lifestyle interventions including nutrition, physical activity, and behavior management.    Not a good month.  \"Cheated\" while on vacation. Eating out more.  Restaurants.  " He still feels good.  He was aware that he had gained weight.  Takes phentermine most days. When he started he was eating eggs, salad and sensible dinner.  Now he is eating yogurt.  Lunch is a shake with blue berries.  Nighttime is grilled chicken.  He wonders if he is eating enough fruit.  He thinks that he is eating enough.  Lots of nuts.  Hunger is well controlled.    Are you experiencing any side effects to the medications:  No  Hunger control:  good  Exercise was discussed: yes  Taking supplements:  yes  Discussed journaling food:  yes  Patient is pleased with the current results:  no  The patient is following the nutrition plan:  Unclear if eating sufficient calories.  Barriers to losing weight:  Behavior:  social calories    Patient Active Problem List   Diagnosis     BMI 34.0-34.9,adult     Class 1 obesity due to excess calories with serious comorbidity in adult     Hypertriglyceridemia       Current Outpatient Prescriptions on File Prior to Visit   Medication Sig Dispense Refill     varenicline (CHANTIX) 1 mg tablet Take 1 tablet (1 mg total) by mouth 2 (two) times a day. 60 tablet 2     VIAGRA 100 mg tablet TAKE ONE-HALF TABLET BY MOUTH AS NEEDED FOR ERECTILE DYSFUNCTION 10 tablet 5     [DISCONTINUED] phentermine (ADIPEX-P) 37.5 mg tablet Take 1/2 tablet PO twice daily 30 tablet 0     No current facility-administered medications on file prior to visit.        Objective:  Vitals:    02/05/18 1610   BP: 138/80   Pulse: 60     Initial Weight: 249.3 lbs  Weight: 243 lb 1.6 oz (110.3 kg)  Weight loss from initial: 6.2  % Weight loss: 2.49 %  Body mass index is 33.91 kg/(m^2).  General:  Patient is alert, pleasant, no distress.  Patient is obese.    This note has been dictated using voice recognition software. Any grammatical or context distortions are unintentional and inherent to the software

## 2021-06-15 NOTE — PROGRESS NOTES
ASSESSMENT:  1. Class 1 obesity due to excess calories with serious comorbidity and body mass index (BMI) of 34.0 to 34.9 in adult     2. BMI 34.0-34.9,adult           PLAN:  Follow up in 1 month.  Continue medications.  Continue supplements.  This month concentrate on expanding meal options and vegetables.   Doing well with phentermine, refill given today.  Recommend increasing movement throughout the day--sitting less, moving more.  Will increase activity over time to reach a goal of at least 150 minutes of moderate exercise each week.  Behavior modification:  Cognitive restructuring exercises, journaling stressors, triggers for food cravings.  Dietary Intervention:  Reduced calorie, reduced carbohydrates, whole food diet.  Greater than 50% of this 15 minute visit was spent in counseling regarding patient's obesity, medications, dietary, exercise, and behavior modification recommendations.      SUBJECTIVE  Patient presents for treatment of chronic, comorbid conditions using intensive therapeutic lifestyle interventions including nutrition, physical activity, and behavior management.  The patient comes in today with continued weight loss.  He overall feels that he is definitely taking a healthy approach to nutrition and that the phentermine is helpful.  He also is doing a reasonably good job with his exercise.  The patient felt that it was a bit more challenging over the holidays but is looking forward to getting on track at the beginning of the new year.  He is fairly repetitive and what he chooses to eat each day.  He reports that there is only a couple of vegetables that he eats including broccoli or green beans and eats is on a regular basis.  He also uses protein smoothies at lunch frequently.    Are you experiencing any side effects to the medications:  No  Hunger control:  good  Exercise was discussed: yes  Taking supplements:  yes  Discussed journaling food:  Yes;   Patient is pleased with the current  results:  yes  The patient is following the nutrition plan:  yes  Barriers to losing weight: repetat gbaby diet    Patient Active Problem List   Diagnosis     BMI 34.0-34.9,adult     Class 1 obesity due to excess calories with serious comorbidity in adult       Current Outpatient Prescriptions on File Prior to Visit   Medication Sig Dispense Refill     varenicline (CHANTIX) 1 mg tablet Take 1 tablet (1 mg total) by mouth 2 (two) times a day. 60 tablet 2     VIAGRA 100 mg tablet TAKE ONE-HALF TABLET BY MOUTH AS NEEDED FOR ERECTILE DYSFUNCTION 10 tablet 5     No current facility-administered medications on file prior to visit.        The following portions of the patient's history were reviewed and updated as appropriate: allergies, current medications, past family history, past medical history, past social history, past surgical history and problem list.    Review of Systems  Pertinent items are noted in HPI.        OBJECTIVE:  Vitals:    01/05/18 1500   BP: 122/72   Pulse: 68     Initial Weight: 249.3 lbs  Weight: 236 lb 6.4 oz (107.2 kg)  Weight loss from initial: 12.9  % Weight loss: 5.17 %  Body mass index is 32.97 kg/(m^2).  General:  Patient is alert, pleasant, no distress.  Patient is obese.       .  This note has been dictated using voice recognition software. Any grammatical or context distortions are unintentional and inherent to the software

## 2021-06-16 NOTE — PROGRESS NOTES
ASSESSMENT:  1. Hypertriglyceridemia     2. BMI 34.0-34.9,adult  phentermine (ADIPEX-P) 37.5 mg tablet   3. Class 1 obesity due to excess calories with serious comorbidity and body mass index (BMI) of 34.0 to 34.9 in adult           PLAN:  Follow up in 1 month.  Continue medications.  Continue supplements.  This month concentrate on increasing exercise.  We discussed that with the recent weight gain that phentermine may not be an effective medication for him.  However, he really feels that the challenges of the month or more the reason and that the phentermine is still somewhat beneficial.  He would like to be given 1 more month to try to get back on track and see some results with his weight loss.  We agreed to that and I will see him back in 1 month.  We talked about goalsetting and specifics of what is going to do this month to get back on track.  He stated that he should be able to get to the gym 3 times during the week specifically every Saturday and then 2 more times during the week.  He is going to try to focus on making good choices each of the meals.  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  Doing well with phentermine, refill given today.  Recommend increasing movement throughout the day--sitting less, moving more.  Will increase activity over time to reach a goal of at least 150 minutes of moderate exercise each week.  Behavior modification:  Cognitive restructuring exercises, journaling stressors, triggers for food cravings.  Dietary Intervention:  Reduced calorie, reduced carbohydrates, whole food diet.  Greater than 50% of this 15 minute visit was spent in counseling regarding patient's obesity, medications, dietary, exercise, and behavior modification recommendations.      SUBJECTIVE  Patient presents for treatment of chronic, comorbid conditions using intensive therapeutic lifestyle interventions including nutrition, physical activity, and  behavior management.  The patient had a very challenging month reporting that he was out of town for about 15 of the days.  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  He was unable to keep up his exercise schedule during this time because of that and also found himself drinking more and eating more in the evenings with work colleagues.  He does feel that the phentermine is helping curb hunger but does not feel it works quite as long as it used to and also feels it is not as effective as it was initially.  The patient is the main person to cook and that his family as his wife does not cook at all.  They have tried hello fresh a couple of times and found that to be healthy and a nice option.  He is motivated to get back on track this month and is not happy with his weight gain.    Are you experiencing any side effects to the medications:  No  Hunger control:  unchanged  Exercise was discussed: yes  Taking supplements:  yes  Discussed journaling food:  yes  Patient is pleased with the current results:  no  The patient is following the nutrition plan:  no  Barriers to losing weight:  Behavior:  social calories    Patient Active Problem List   Diagnosis     BMI 34.0-34.9,adult     Class 1 obesity due to excess calories with serious comorbidity in adult     Hypertriglyceridemia       Current Outpatient Prescriptions on File Prior to Visit   Medication Sig Dispense Refill     varenicline (CHANTIX) 1 mg tablet Take 1 tablet (1 mg total) by mouth 2 (two) times a day. 60 tablet 2     VIAGRA 100 mg tablet TAKE ONE-HALF TABLET BY MOUTH AS NEEDED FOR ERECTILE DYSFUNCTION 10 tablet 5     [DISCONTINUED] phentermine (ADIPEX-P) 37.5 mg tablet Take 1/2 tablet PO twice daily 30 tablet 0     No current facility-administered medications on file prior to visit.        The following portions of the patient's history were reviewed and updated as appropriate: allergies, current medications, past family history, past medical  history, past social history, past surgical history and problem list.    Review of Systems  Pertinent items are noted in HPI.        OBJECTIVE:  Vitals:    03/06/18 1500   BP: 134/74   Pulse: 70     Initial Weight: 249.3 lbs  Weight: 245 lb 3.2 oz (111.2 kg)  Weight loss from initial: 4.1  % Weight loss: 1.64 %  Body mass index is 34.2 kg/(m^2).  General:  Patient is alert, pleasant, no distress.  Patient is obese.       .  This note has been dictated using voice recognition software. Any grammatical or context distortions are unintentional and inherent to the software

## 2021-06-18 NOTE — PROGRESS NOTES
[facility] Clinic Note    Patient Name: Rajni Bustillo  Patient Age: 37 y.o.  YOB: 1980  MRN: [id number]    Date of visit: 5/31/2018    Patient Active Problem List   Diagnosis     BMI 34.0-34.9,adult     Class 1 obesity due to excess calories with serious comorbidity in adult     Hypertriglyceridemia     Social History     Social History Narrative     Outpatient Encounter Prescriptions as of 5/31/2018   Medication Sig Dispense Refill     albuterol (PROAIR HFA;PROVENTIL HFA;VENTOLIN HFA) 90 mcg/actuation inhaler Inhale 2 puffs every 6 (six) hours as needed for wheezing. 1 each 0     CHANTIX 1 mg tablet TAKE ONE TABLET BY MOUTH TWICE A DAY 60 tablet 2     VIAGRA 100 mg tablet TAKE ONE-HALF TABLET BY MOUTH AS NEEDED FOR ERECTILE DYSFUNCTION 10 tablet 5     doxycycline (VIBRAMYCIN) 100 MG capsule Take 1 capsule (100 mg total) by mouth 2 (two) times a day for 5 days. 10 capsule 0     ipratropium (ATROVENT) 0.02 % nebulizer solution Take 2.5 mL (0.5 mg total) by nebulization 4 (four) times a day as needed (cough). 140 mL 0     phentermine (ADIPEX-P) 37.5 mg tablet Take 1/2 tablet PO twice daily 30 tablet 0     No facility-administered encounter medications on file as of 5/31/2018.        Chief Complaint:   Chief Complaint   Patient presents with     Bronchitis     POSSIBLE       /88  Pulse 60  Temp 98.2  F (36.8  C) (Oral)   Resp 12  Wt (!) 256 lb 9 oz (116.4 kg)  SpO2 97%  BMI 35.78 kg/m2  HPI:   Still coughing, this is improving. Ipratropium and albuterol helping.  No shortness of breath.  No fevers. In the morning some nasal congestion, none later.      ROS: Pertinent ros findings in hpi, all other systems negative.    Objective/Physical Exam:     /88  Pulse 60  Temp 98.2  F (36.8  C) (Oral)   Resp 12  Wt (!) 256 lb 9 oz (116.4 kg)  SpO2 97%  BMI 35.78 kg/m2    Heart: Regular rhythm, no rubs or gallops.  Normal rate: y  Murmur: n    Lungs:   Clear to auscultation  bilaterally: n  Wheeze: left lower  Rhonchi: left lower  Crackles: left lower  Area of decreased breath sounds: left lower  Labored breathing: n      Left eye:  Conjunctival erythema: n  Purulent drainage: n  Proptosis:n    Right eye:  Conjunctival erythema: n  Purulent drainage: n  Proptosis: n    No auricular protrusion or erythema/swelling over mastoid area bilaterally.    No white patches that scrape off, no deviation of uvula. no ulcerations noted.    No torticollis, neck stiffness, drooling, muffled/hot potato voice, submandibular swelling, trismus or stridor.    Pharynx:   Erythema: no  Pus pockets: n  Tender cervical lymphadenopathy: n    Well appearing: y  Moist mucous membranes: y    MSK: no muscle or joint swelling  Neuro: no dysarthria or gross asymmetry  Psych: full affect, oriented x 3  Hematologic: no petechiae or purpura    Skin: No rash.       Assessment/Plan:  No results found for this or any previous visit (from the past 24 hour(s)).  Encounter Diagnoses   Name Primary?     Cough Yes       Orders Placed This Encounter   Procedures     XR Chest 2 Views       I am concerned for possible pneumonia given his abnormal lung exam, I am recommending doxycycline at this point.  If not improving or if any worsening I recommend seeking medical attention.    Patient Instructions   May use tylenol 1,000mg every 8 hours if no liver disease  May use Ibuprofen 400mg with food every 6 hours as needed for comfort if no kidney or peptic ulcer disease  Vicks vaporub applied to chest as needed, particularly at night for sore throat/cough/nasal congestion  May try 1 spoonful honey or honey/lemon in 1:1 ratio as needed for cough/sore throat  May use over the counter dextromethorphan for cough suppressant  May use phenol (chloraseptic) spray or cepacol lozenges for sore throat  May use ponaris for nasal congestion  May try oxymetazoline 2 sprays each nostril every 12 hours for a maximum of 3 days for nasal  congestion  May use pseudoephedrine (sudafed) for relief of nasal/sinus congestion  May use sinus rinse (i.e. neti) with sterile water (bottled or tap water boiled x 10 minutes and cooled to room temperature) 2-3x daily for nasal congestion  May use flonase 1-2 sprays each nostril daily alone or after sinus rinse. Have head look down and direct spray upward and slightly lateral of midline. Discontinue if nosebleeds.    May use zinc sulfate 110mg po bid with food until cessation of cold symptoms  May try umcka coldcare 1 tsp 5x/day or 1 1/2 tsp every 8 hours if within 48 hours of onset of symptoms    Patient advised that umcka coldcare is an herbal product and therefore not controlled with the same rigorous standards the FDA uses for prescribed medications. The primary side effect noted in trials of this medication was gi upset, however, there may be other side effects. Safety in pregnancy or breastfeeding has not been established.    Counseling included treatments, treatment options, risks and benefits and diagnosis.  The patient was interactive, attentive, verbalized understanding, and we discussed plan.     If condition worsens or you experience symptoms that are concerning to you, seek medical care immediately.    If fever or worsening, seek medical attention.      Counseled patient regarding treatments, treatment options, risks and benefits and diagnosis.  The patient was interactive, attentive, verbalized understanding, and we discussed plan.     Tyrone Gambino MD

## 2021-06-18 NOTE — PROGRESS NOTES
[facility] Clinic Note    Patient Name: Rajni Bustillo  Patient Age: 37 y.o.  YOB: 1980  MRN: [id number]    Date of visit: 5/14/2018    Patient Active Problem List   Diagnosis     BMI 34.0-34.9,adult     Class 1 obesity due to excess calories with serious comorbidity in adult     Hypertriglyceridemia     Social History     Social History Narrative     Outpatient Encounter Prescriptions as of 5/14/2018   Medication Sig Dispense Refill     CHANTIX 1 mg tablet TAKE ONE TABLET BY MOUTH TWICE A DAY 60 tablet 2     phentermine (ADIPEX-P) 37.5 mg tablet Take 1/2 tablet PO twice daily 30 tablet 0     VIAGRA 100 mg tablet TAKE ONE-HALF TABLET BY MOUTH AS NEEDED FOR ERECTILE DYSFUNCTION 10 tablet 5     ipratropium (ATROVENT) 0.02 % nebulizer solution Take 2.5 mL (0.5 mg total) by nebulization 4 (four) times a day as needed (cough). 140 mL 0     No facility-administered encounter medications on file as of 5/14/2018.        Chief Complaint:   Chief Complaint   Patient presents with     Cough     x3 weeks       HPI:   Duration:20 days    Cough: Yes  Sore throat: started  Rhinitis/nasal congestion: none during day.  Nasal is improving.  Some post nasal drip  Sinus pressure/pain: no  Otalgia: no  Fever: 2 weeks ago    History of Asthma or other lung disease: no      ROS: Pertinent ros findings in hpi, all other systems negative.    Objective/Physical Exam:     /88  Pulse (!) 59  Temp 97.8  F (36.6  C) (Oral)   Resp 12  Wt (!) 259 lb 11.2 oz (117.8 kg)  SpO2 94%  BMI 36.22 kg/m2    Heart: Regular rhythm, no rubs or gallops.  Normal rate: y  Murmur: n    Lungs:   Clear to auscultation bilaterally: y  Wheeze: n  Rhonchi: n  Crackles: n  Area of decreased breath sounds: n  Labored breathing: n      Left eye:  Conjunctival erythema: n  Purulent drainage: n  Proptosis:n    Right eye:  Conjunctival erythema: n  Purulent drainage: n  Proptosis: n    Right tympanic membrane:   color: pale  ossicles  visualized: y  umbo distorted: n  cone of light distorted: n  Air/fluid levels: n  Drainage:n  Canal: not edematous no discharge  Pain with external ear manipulation: n  Foreign body: n    Left tympanic membrane:   color: pale  ossicles visualized: y  umbo distorted: n  cone of light distorted: n  Air/fluid levels: n  Drainage: n  Canal: not edematous no discharge  Pain with external ear manipulation: n  Foreign body: n    No auricular protrusion or erythema/swelling over mastoid area bilaterally.    No white patches that scrape off, no deviation of uvula. no ulcerations noted.    No torticollis, neck stiffness, drooling, muffled/hot potato voice, submandibular swelling, trismus or stridor.    Pharynx:   Erythema: no  Pus pockets: n  Tender cervical lymphadenopathy: n    Well appearing: y  Moist mucous membranes: y    MSK: no muscle or joint swelling  Neuro: no dysarthria or gross asymmetry  Psych: full affect, oriented x 3  Hematologic: no petechiae or purpura    Skin: No rash.     Recheck 02 95%    Assessment/Plan:    Encounter Diagnoses   Name Primary?     Bronchitis Yes       No fever, clear lungs on exam.  We discussed doing cxr vs. Deferring - pt. Prefers to defer xr.  May try ipratropium for cough.  If develop fever, shortness of breath or any other concerning symptom, rtc.      Patient Instructions   May use tylenol 1,000mg every 8 hours if no liver disease  May use Ibuprofen 400mg with food every 6 hours as needed for comfort if no kidney or peptic ulcer disease  Vicks vaporub applied to chest as needed, particularly at night for sore throat/cough/nasal congestion  May try 1 spoonful honey or honey/lemon in 1:1 ratio as needed for cough/sore throat  May use over the counter dextromethorphan for cough suppressant  May use phenol (chloraseptic) spray or cepacol lozenges for sore throat  May use ponaris for nasal congestion  May try oxymetazoline 2 sprays each nostril every 12 hours for a maximum of 3 days for  nasal congestion  May use pseudoephedrine (sudafed) for relief of nasal/sinus congestion  May use sinus rinse (i.e. neti) with sterile water (bottled or tap water boiled x 10 minutes and cooled to room temperature) 2-3x daily for nasal congestion  May use flonase 1-2 sprays each nostril daily alone or after sinus rinse. Have head look down and direct spray upward and slightly lateral of midline. Discontinue if nosebleeds.    May use zinc sulfate 110mg po bid with food until cessation of cold symptoms  May try umcka coldcare 1 tsp 5x/day or 1 1/2 tsp every 8 hours if within 48 hours of onset of symptoms    Patient advised that umcka coldcare is an herbal product and therefore not controlled with the same rigorous standards the FDA uses for prescribed medications. The primary side effect noted in trials of this medication was gi upset, however, there may be other side effects. Safety in pregnancy or breastfeeding has not been established.    Counseling included treatments, treatment options, risks and benefits and diagnosis.  The patient was interactive, attentive, verbalized understanding, and we discussed plan.     If condition worsens or you experience symptoms that are concerning to you, seek medical care immediately.        Tyrone Gambino MD

## 2021-06-19 NOTE — PROGRESS NOTES
[facility] Clinic Note    Patient Name: Rajni Bustillo  Patient Age: 37 y.o.  YOB: 1980  MRN: [id number]    Date of visit: 7/16/2018    Patient Active Problem List   Diagnosis     BMI 34.0-34.9,adult     Class 1 obesity due to excess calories with serious comorbidity in adult     Hypertriglyceridemia     Social History     Social History Narrative     Family History   Problem Relation Age of Onset     No Medical Problems Mother      Cancer Father 57       Outpatient Encounter Prescriptions as of 7/16/2018   Medication Sig Dispense Refill     albuterol (PROAIR HFA;PROVENTIL HFA;VENTOLIN HFA) 90 mcg/actuation inhaler Inhale 2 puffs every 6 (six) hours as needed for wheezing. 1 each 0     CHANTIX 1 mg tablet TAKE ONE TABLET BY MOUTH TWICE A DAY 60 tablet 2     phentermine (ADIPEX-P) 37.5 mg tablet Take 1/2 tablet PO twice daily 30 tablet 0     sildenafil (VIAGRA) 100 MG tablet TAKE ONE-HALF TABLET BY MOUTH AS NEEDED FOR ERECTILE DYSFUNCTION 10 tablet 5     ipratropium (ATROVENT) 0.02 % nebulizer solution Take 2.5 mL (0.5 mg total) by nebulization 4 (four) times a day as needed (cough). 140 mL 0     levoFLOXacin (LEVAQUIN) 500 MG tablet Take 1 tablet (500 mg total) by mouth daily for 7 days. 7 tablet 0     predniSONE (DELTASONE) 20 MG tablet Take 2 tablets (40 mg total) by mouth daily for 5 days. 10 tablet 0     No facility-administered encounter medications on file as of 7/16/2018.        Chief Complaint:   Chief Complaint   Patient presents with     Bronchitis     possible       HPI:   Patient has continued to have a cough since we last saw him.  No hemoptysis, no dyspnea on exertion, no leg edema, no orthopnea.  No fever.  He said he did have some shortness of breath yesterday and then not at all today.  He did take some of his albuterol yesterday, it seemed to help a little bit.  His sputum is purulent, doxycycline did not help this whatsoever.  He does feel a tickle in his throat on a regular  "basis and does have some postnasal drip he thinks, no facial pain.    ROS: Pertinent ros findings in hpi, all other systems negative.    Objective/Physical Exam:     /82  Pulse 61  Temp 97.9  F (36.6  C) (Oral)   Resp 12  Ht 5' 11\" (1.803 m)  Wt (!) 260 lb 6 oz (118.1 kg)  SpO2 95%  BMI 36.31 kg/m2    Heart: Regular rhythm, no rubs or gallops.  Normal rate: y  Murmur: n    Lungs:   Clear to auscultation bilaterally: n  Wheeze: n  Rhonchi: y  Crackles: n  Area of decreased breath sounds: n  Labored breathing: n      Left eye:  Conjunctival erythema: n  Purulent drainage: n  Proptosis:n    Right eye:  Conjunctival erythema: n  Purulent drainage: n  Proptosis: n    Right tympanic membrane:   color: pale  ossicles visualized: y  umbo distorted: n  cone of light distorted: n  Air/fluid levels: n  Drainage:n  Canal: not edematous no discharge  Pain with external ear manipulation: n  Foreign body: n    Left tympanic membrane:   color: pale  ossicles visualized: y  umbo distorted: n  cone of light distorted: n  Air/fluid levels: n  Drainage: n  Canal: not edematous no discharge  Pain with external ear manipulation: n  Foreign body: n    No auricular protrusion or erythema/swelling over mastoid area bilaterally.    No white patches that scrape off, no deviation of uvula. no ulcerations noted.    No torticollis, neck stiffness, drooling, muffled/hot potato voice, submandibular swelling, trismus or stridor.    Pharynx:   Erythema: n   Pus pockets: n  Tender cervical lymphadenopathy: n    Well appearing: y  Moist mucous membranes: y    MSK: no muscle or joint swelling  Neuro: no dysarthria or gross asymmetry  Psych: full affect, oriented x 3  Hematologic: no petechiae or purpura    Skin: No rash.     Assessment/Plan:    Problem List Items Addressed This Visit     None      Visit Diagnoses     Cough    -  Primary    Relevant Orders    XR Chest 2 Views            Patient seems to have prolonged bronchitis, I am " repeating a chest x-ray today.  We will try Levaquin as well as prednisone.  I did recommend doing sinus rinses as he does seem to have a sinus component.  He may have undiagnosed asthma which is why I am using the prednisone, I asked him to let me know in a week how he is doing, we may consider doing pulmonary function tests depending on his improvement and possibly a pulmonology referral.  We did discuss risks benefits for Levaquin.    Patient Instructions   May use sinus rinse (i.e. neti) with sterile water (bottled or tap water boiled x 10 minutes and cooled to room temperature) 2-3x daily for nasal congestion  May use flonase 1-2 sprays each nostril daily alone or after sinus rinse. Have head look down and direct spray upward and slightly lateral of midline. Discontinue if nosebleeds.   Alternatively, may use 4 sprays flonase in twice daily rinse or 6 sprays in once daily rinse.     RTC if sinusitis not improving after 10 days, improves and then worsens, or sinus pain lateralizes or develop focal pain with temp >101.           Counseling included treatments, treatment options, risks and benefits and diagnosis.  The patient was interactive, attentive, verbalized understanding, and we discussed plan.     If condition worsens or you experience symptoms that are concerning to you, seek medical care immediately.    Tyrone Gambino MD

## 2021-06-20 NOTE — PROGRESS NOTES
"Assessment/Plan:    Rajni Bustillo is a 37 y.o. male presenting for:    1. Pneumonia  Chest x-ray looks overall fairly clear.  There is some atelectasis in his right middle lobe.  I encouraged him to use his albuterol as needed.  We did discuss starting a daily inhaler but decided against it ultimately as he will be seeing the pulmonologist within the next month or so.  If he begins to get worse or needs his rescue inhaler more frequently he will let me know.  Otherwise, we discussed signs and symptoms that would necessitate him following up here in clinic or in the emergency department.    Congratulations was given regarding smoking cessation  - XR Chest 2 Views        There are no discontinued medications.        Chief Complaint:  Chief Complaint   Patient presents with     Follow-up     Pneumonia [facility] 8/13/18       Subjective:   Rajni Bustillo is a very pleasant 37-year-old gentleman presenting to the clinic today for follow-up of a severe pneumonia.  The patient had actually been battling upper respiratory infection for the last several months.  He had been seen several times in our clinic by 1 of my partners.  He had been on azithromycin initially but then transferred to [facility] with some prednisone given his clinical picture.  His chest x-rays had been overall unremarkable.    He went to [place] and unfortunately became very short of breath.  He thought that he may be \"overdosed\" on his albuterol inhaler and went to the hospital.  He was admitted for 5 days and in the ICU for several days as well due to low oxygen saturations.  A CT scan had showed a dense pneumonia in his right middle and lower lobe.    He was given IV antibiotics while in the hospital and discharged on doxycycline.  He was seen at the emergency room through [facility] about a week later with returning shortness of breath and chest x-ray looked improved.  He was started on Augmentin at that point.    He is " "overall feeling well.  He has quit smoking.  He just wants to ensure that chest x-ray is back to normal.  He has not had any fevers.  He continues to have mild cough.  Since quitting smoking has been coughing up some brown sputum.    12 point review of systems completed and negative except for what has been described above    History   Smoking Status     Former Smoker     Packs/day: 1.00     Quit date: 8/7/2018   Smokeless Tobacco     Never Used       Current Outpatient Prescriptions   Medication Sig Note     albuterol (PROAIR HFA;PROVENTIL HFA;VENTOLIN HFA) 90 mcg/actuation inhaler Inhale 2 puffs every 6 (six) hours as needed for wheezing.      CHANTIX 1 mg tablet TAKE ONE TABLET BY MOUTH TWICE A DAY.      ipratropium-albuterol (DUO-NEB) 0.5-2.5 mg/3 mL nebulizer Inhale 3 mL. 8/28/2018: Received from: [facility] Received Sig: Take 1 vial (3 mLs) by nebulization every 6 hours as needed for shortness of breath / dyspnea or wheezing     methadone (DOLOPHINE) 10 MG tablet Take 130 mg by mouth. 8/28/2018: Received from: [facility] Received Sig: Take 130 mg by mouth daily,     phentermine (ADIPEX-P) 37.5 mg tablet Take 1/2 tablet PO twice daily      sildenafil (VIAGRA) 100 MG tablet TAKE ONE-HALF TABLET BY MOUTH AS NEEDED FOR ERECTILE DYSFUNCTION      VIAGRA 100 mg tablet TAKE ONE-HALF TABLET BY MOUTH AS NEEDED FOR ERECTILE DYSFUNCTION      ipratropium (ATROVENT) 0.02 % nebulizer solution Take 2.5 mL (0.5 mg total) by nebulization 4 (four) times a day as needed (cough).          Objective:  Vitals:    08/28/18 1121   BP: 124/78   Pulse: 62   Resp: 12   Temp: 98  F (36.7  C)   TempSrc: Oral   SpO2: 95%   Weight: (!) 267 lb 3 oz (121.2 kg)   Height: 5' 11\" (1.803 m)       Vital signs reviewed and stable  General: No acute distress  Psych: Appropriate affect  HEENT: moist mucous membranes  Lymph: no cervical or supraclavicular lymphadenopathy  Cardiovascular: regular rate and rhythm with no " murmur  Pulmonary: clear to auscultation bilaterally with no wheeze  Abdomen: soft, non tender, non distended with normo-active bowel sounds  Extremities: warm and well perfused with no edema  Skin: warm and dry with no rash         This note has been dictated and transcribed using voice recognition software.   Any errors in transcription are unintentional and inherent to the software.

## 2021-06-23 NOTE — PROGRESS NOTES
Assessment/Plan:    Rajni Bustillo is a 38 y.o. male presenting for:    Hospital follow-up: All medications were reconciled today.  He does not need a refill of any of his medications.    1. Other acute pulmonary embolism without acute cor pulmonale (H)  He will continue on Eliquis.  I have recommended that he continue at least 3 months.  At that point we can do an ultrasound of his like to see if his blood clot has resolved.  If the clot has resolved I would feel comfortable taking him off of the Eliquis.  If not I would recommend 3 more months of the blood thinner.  He is in agreement with this plan and will contact me in about 3 months for the ultrasound order.    This is fairly clearly a provoked clot given his ankle surgery and lack of taking aspirin.  I do not think further workup is necessarily warranted at this point.    2. BMI 40.0-44.9, adult (H)  Discussed healthy lifestyle modification.  Patient is able to start putting more weight on his ankle which she is excited about.        Medications Discontinued During This Encounter   Medication Reason     sildenafil (VIAGRA) 100 MG tablet      phentermine (ADIPEX-P) 37.5 mg tablet Therapy completed           Chief Complaint:  Chief Complaint   Patient presents with     Hospital Visit Follow Up     [facility] 01/26/19 blood clot       Subjective:   Rajni Bustillo is a very pleasant 38-year-old gentleman presenting to the clinic today for hospital follow-up.  Patient was admitted to [facility] from January 25 - January 28, 2019.    He has a past medical history significant for fairly severe pneumonia this past fall necessitating a hospital stay in the ICU..  He is on the pulmonologist who put him on Advair which she had been taking.  Unfortunately on December 21 he broke his right ankle after falling on some stairs.  He had surgery to repair his ankle fracture on December 28.  He admits that he potentially did not take the aspirin as he was  prescribed.  On  he began to have some chest pain and shortness of breath and was seen in the emergency department and found to have bilateral pulmonary emboli likely coming from a clot in his right leg.  He was admitted to the ICU and started on blood thinners.  He remains on Eliquis at this point.  He is no longer having any pain in his chest and his shortness of breath has overall resolved.    Today he wants to discuss how long he should be on the blood thinner for.  He is tolerating it well.    12 point review of systems completed and negative except for what has been described above    Social History     Tobacco Use   Smoking Status Former Smoker     Packs/day: 1.00     Last attempt to quit: 2018     Years since quittin.5   Smokeless Tobacco Never Used       Current Outpatient Medications   Medication Sig Note     apixaban (ELIQUIS) 5 mg Tab tablet 2 tabs (10 mg) PO BID for the next 7 days and then 1 tab 5 mg PO BID.      ipratropium-albuterol (DUO-NEB) 0.5-2.5 mg/3 mL nebulizer Inhale 3 mL. 2018: Received from: [facility] Received Sig: Take 1 vial (3 mLs) by nebulization every 6 hours as needed for shortness of breath / dyspnea or wheezing     methadone (DOLOPHINE) 10 MG tablet Take 130 mg by mouth. 2018: Received from: [facility] Received Sig: Take 130 mg by mouth daily,     sildenafil (VIAGRA) 100 MG tablet TAKE ONE-HALF TABLET BY MOUTH AS NEEDED FOR ERECTILE DYSFUNCTION.      varenicline (CHANTIX) 1 mg tablet Take 1 tablet (1 mg total) by mouth 2 (two) times a day.      VENTOLIN HFA 90 mcg/actuation inhaler INHALE TWO PUFFS BY MOUTH EVERY 6 HOURS FOR 10 DAYS      ipratropium (ATROVENT) 0.02 % nebulizer solution Take 2.5 mL (0.5 mg total) by nebulization 4 (four) times a day as needed (cough).          Objective:  Vitals:    19 1322   BP: 132/68   Pulse: (!) 120   Resp: 12   Temp: 97.8  F (36.6  C)   TempSrc: Oral   Weight: (!) 290 lb 4 oz  "(131.7 kg)   Height: 5' 11\" (1.803 m)       Body mass index is 40.48 kg/m .    Vital signs reviewed and stable  General: No acute distress  Psych: Appropriate affect  HEENT: moist mucous membranes, pupils equal, round, reactive to light and accomodation, posterior oropharynx clear of erythema or exudate, tympanic membranes are pearly grey bilaterally  Lymph: no cervical or supraclavicular lymphadenopathy  Cardiovascular: regular rate and rhythm with no murmur  Pulmonary: clear to auscultation bilaterally with no wheeze  Abdomen: soft, non tender, non distended with normo-active bowel sounds  Extremities: warm and well perfused with no edema, walking boot on right foot  Skin: warm and dry with no rash         This note has been dictated and transcribed using voice recognition software.   Any errors in transcription are unintentional and inherent to the software.  "

## 2021-07-03 NOTE — ADDENDUM NOTE
Addendum Note by Aleah Floyd MD at 2/26/2020  1:45 PM     Author: Aleah Floyd MD Service: -- Author Type: Physician    Filed: 2/26/2020  1:45 PM Encounter Date: 2/26/2020 Status: Signed    : Aleah Floyd MD (Physician)    Addended by: ALEAH FLOYD on: 2/26/2020 01:45 PM        Modules accepted: Orders

## 2021-07-03 NOTE — ADDENDUM NOTE
Addendum Note by Aleah Floyd MD at 3/16/2020  2:39 PM     Author: Aleah Floyd MD Service: -- Author Type: Physician    Filed: 3/16/2020  2:39 PM Encounter Date: 2/26/2020 Status: Signed    : Aleah Floyd MD (Physician)    Addended by: ALAEH FLOYD on: 3/16/2020 02:39 PM        Modules accepted: Orders

## 2021-07-03 NOTE — ADDENDUM NOTE
Addendum Note by Aleah Floyd MD at 9/17/2020  3:17 PM     Author: Aleah Floyd MD Service: -- Author Type: Physician    Filed: 9/17/2020  3:17 PM Encounter Date: 9/17/2020 Status: Signed    : Aleah Floyd MD (Physician)    Addended by: ALEAH FLOYD on: 9/17/2020 03:17 PM        Modules accepted: Orders

## 2021-07-03 NOTE — ADDENDUM NOTE
Addendum Note by Aleah Floyd MD at 4/2/2019  3:07 PM     Author: Aleah Floyd MD Service: -- Author Type: Physician    Filed: 4/2/2019  3:07 PM Encounter Date: 4/2/2019 Status: Signed    : Aleah Floyd MD (Physician)    Addended by: ALEAH FLOYD on: 4/2/2019 03:07 PM        Modules accepted: Orders

## 2021-07-30 ENCOUNTER — OFFICE VISIT (OUTPATIENT)
Dept: FAMILY MEDICINE | Facility: CLINIC | Age: 41
End: 2021-07-30
Payer: COMMERCIAL

## 2021-07-30 VITALS
RESPIRATION RATE: 15 BRPM | BODY MASS INDEX: 31.19 KG/M2 | OXYGEN SATURATION: 98 % | TEMPERATURE: 98.3 F | DIASTOLIC BLOOD PRESSURE: 64 MMHG | SYSTOLIC BLOOD PRESSURE: 120 MMHG | HEIGHT: 71 IN | HEART RATE: 70 BPM | WEIGHT: 222.8 LBS

## 2021-07-30 DIAGNOSIS — M25.512 LEFT SHOULDER PAIN, UNSPECIFIED CHRONICITY: Primary | ICD-10-CM

## 2021-07-30 PROCEDURE — 99213 OFFICE O/P EST LOW 20 MIN: CPT | Performed by: PHYSICIAN ASSISTANT

## 2021-07-30 ASSESSMENT — MIFFLIN-ST. JEOR: SCORE: 1940.61

## 2021-07-30 NOTE — PROGRESS NOTES
"    Assessment & Plan     ASSESSMENT/PLAN:      ICD-10-CM    1. Left shoulder pain, unspecified chronicity  M25.512 [facility] Hand Referral       Patient Instructions   Ice your shoulder when sore.   Do hanging arm rolls - increase weight as you go  Stretch your shoulder by grabbing a broom handle with both hands spread apart. Elevate and stretch your sore shoulder by raising that arm with your other arm. Your sore arm is just hanging on to the handle and the handle does the stretching.  Walk your arm up the wall to stretch it  Keep shoulder in one place and rotate elbow side to side       Return in about 1 month (around 8/30/2021) for if not improving or if worsening.    SILVA Escamilla [facility] FELIPE villegas is a 40 year old who presents for the following health issues     HPI   Chief Complaint   Patient presents with     Shoulder Pain     Left shoulder pain x 2 months, no known injury that he is aware of. States that with certain movements pain is worse. Denies any numbness or tingling no pain does not radiate down his arm     No previous injury, surgery  Right handed  Walks a lot, computer work, weight lifting. Drives with left hand but jeronimo't been as much  Not weight lifting now.  Hurts to lift hand/forward flexion, twisting hand  Pain with all movements          Review of Systems   Other than noted above, general, HEENT, respiratory, cardiac, MS, and gastrointestinal systems are negative.       Objective    /64   Pulse 70   Temp 98.3  F (36.8  C) (Tympanic)   Resp 15   Ht 1.8 m (5' 10.87\")   Wt 101.1 kg (222 lb 12.8 oz)   SpO2 98%   BMI 31.19 kg/m    Body mass index is 31.19 kg/m .  Physical Exam   GENERAL APPEARANCE: healthy, alert and no distress  RESP: lungs clear to auscultation - no rales, rhonchi or wheezes  CV: regular rates and rhythm, normal S1 S2, no S3 or S4 and no murmur, click or rub    ORTHO:   SHOULDER Exam-Left   Inspection: no swelling, no " bruising, no discoloration, no obvious deformity, no asymmetry, no glenohumeral joint anterior bulge, no distal clavicle elevation, no muscle atrophy, no scapular winging   Tenderness of: SC joint- no, clavicle(prox-mid)- no, clavicle-(mid-distal)- no, AC joint- no, acromion- no, anterior capsule- no, prox bicep tendon- no, greater tuberosity- no, prox humerus- no, supraspinatous- no, infraspinatous- no, superior trapezious- no, rhomboids- no  No real tenderness to palpation, notes pain anterior and posterior shoulder with movements   Range of Motion: Active- forward flexion- normal, painful, abduction- normal, painful, external rotation- normal, internal rotation- normal, painful  Range of Motion: Passive- forward flexion- normal, abduction- normal, external rotation- normal, internal rotation- normal   Strength: forward flexion- 5/5, abduction- 5/5, internal rotation- 5/5, external rotation- 5/5 and bicep- full   Special tests: Neers- negative, Khoury(supraspinatous)- negative and lift off painful  Notes pain in particular with resisted internal rotation

## 2021-08-03 PROBLEM — I26.99 OTHER ACUTE PULMONARY EMBOLISM WITHOUT ACUTE COR PULMONALE (H): Status: RESOLVED | Noted: 2019-02-11 | Resolved: 2020-02-10

## 2021-08-10 PROBLEM — E66.811 CLASS 1 OBESITY DUE TO EXCESS CALORIES WITH SERIOUS COMORBIDITY IN ADULT: Status: ACTIVE | Noted: 2017-11-06

## 2021-08-10 PROBLEM — F11.21 HISTORY OF NARCOTIC ADDICTION (H): Status: ACTIVE | Noted: 2018-08-07

## 2021-08-10 PROBLEM — E66.09 CLASS 1 OBESITY DUE TO EXCESS CALORIES WITH SERIOUS COMORBIDITY IN ADULT: Status: ACTIVE | Noted: 2017-11-06

## 2021-08-10 PROBLEM — J18.9 RIGHT LOWER LOBE PNEUMONIA: Status: ACTIVE | Noted: 2018-08-07

## 2021-08-10 PROBLEM — F11.21 OPIOID DEPENDENCE IN REMISSION (H): Status: ACTIVE | Noted: 2019-01-25

## 2021-08-10 PROBLEM — E78.1 HYPERTRIGLYCERIDEMIA: Status: ACTIVE | Noted: 2018-02-05

## 2021-08-10 PROBLEM — J45.40 MODERATE PERSISTENT ASTHMA WITHOUT COMPLICATION: Status: ACTIVE | Noted: 2019-01-25

## 2021-08-10 PROBLEM — I26.99 BILATERAL PULMONARY EMBOLISM (H): Status: ACTIVE | Noted: 2019-01-25

## 2021-08-10 PROBLEM — S82.841D CLOSED BIMALLEOLAR FRACTURE OF RIGHT ANKLE WITH ROUTINE HEALING: Status: ACTIVE | Noted: 2019-01-25

## 2021-08-10 PROBLEM — R20.0 NUMBNESS OF LEFT ANTERIOR THIGH: Status: ACTIVE | Noted: 2019-05-31

## 2021-09-25 ENCOUNTER — HEALTH MAINTENANCE LETTER (OUTPATIENT)
Age: 41
End: 2021-09-25

## 2022-02-07 ENCOUNTER — MYC REFILL (OUTPATIENT)
Dept: FAMILY MEDICINE | Facility: CLINIC | Age: 42
End: 2022-02-07
Payer: COMMERCIAL

## 2022-02-07 DIAGNOSIS — G47.00 INSOMNIA, UNSPECIFIED TYPE: ICD-10-CM

## 2022-02-08 NOTE — TELEPHONE ENCOUNTER
Routing refill request to provider for review/approval because:  Drug not on the [facility] refill protocol   Mehreen Braswell RN

## 2022-02-09 ENCOUNTER — MYC MEDICAL ADVICE (OUTPATIENT)
Dept: FAMILY MEDICINE | Facility: CLINIC | Age: 42
End: 2022-02-09
Payer: COMMERCIAL

## 2022-02-09 RX ORDER — ZOLPIDEM TARTRATE 10 MG/1
10 TABLET ORAL
Qty: 90 TABLET | Refills: 0 | Status: SHIPPED | OUTPATIENT
Start: 2022-02-09 | End: 2022-03-30

## 2022-03-30 ENCOUNTER — OFFICE VISIT (OUTPATIENT)
Dept: FAMILY MEDICINE | Facility: CLINIC | Age: 42
End: 2022-03-30
Payer: COMMERCIAL

## 2022-03-30 VITALS
HEART RATE: 58 BPM | OXYGEN SATURATION: 96 % | HEIGHT: 71 IN | BODY MASS INDEX: 30.45 KG/M2 | TEMPERATURE: 97.1 F | SYSTOLIC BLOOD PRESSURE: 112 MMHG | DIASTOLIC BLOOD PRESSURE: 70 MMHG | WEIGHT: 217.5 LBS | RESPIRATION RATE: 12 BRPM

## 2022-03-30 DIAGNOSIS — Z00.00 HEALTHCARE MAINTENANCE: Primary | ICD-10-CM

## 2022-03-30 DIAGNOSIS — Z11.59 NEED FOR HEPATITIS C SCREENING TEST: ICD-10-CM

## 2022-03-30 DIAGNOSIS — Z23 HIGH PRIORITY FOR 2019-NCOV VACCINE: ICD-10-CM

## 2022-03-30 DIAGNOSIS — G47.00 INSOMNIA, UNSPECIFIED TYPE: ICD-10-CM

## 2022-03-30 DIAGNOSIS — Z11.4 SCREENING FOR HIV (HUMAN IMMUNODEFICIENCY VIRUS): ICD-10-CM

## 2022-03-30 DIAGNOSIS — Z87.891 HX OF NICOTINE DEPENDENCE: ICD-10-CM

## 2022-03-30 PROBLEM — R20.0 NUMBNESS OF LEFT ANTERIOR THIGH: Status: RESOLVED | Noted: 2019-05-31 | Resolved: 2022-03-30

## 2022-03-30 PROBLEM — S82.841D CLOSED BIMALLEOLAR FRACTURE OF RIGHT ANKLE WITH ROUTINE HEALING: Status: RESOLVED | Noted: 2019-01-25 | Resolved: 2022-03-30

## 2022-03-30 PROBLEM — E66.09 CLASS 1 OBESITY DUE TO EXCESS CALORIES WITH SERIOUS COMORBIDITY IN ADULT: Status: RESOLVED | Noted: 2017-11-06 | Resolved: 2022-03-30

## 2022-03-30 PROBLEM — F11.21 OPIOID DEPENDENCE IN REMISSION (H): Status: RESOLVED | Noted: 2019-01-25 | Resolved: 2022-03-30

## 2022-03-30 PROBLEM — E66.811 CLASS 1 OBESITY DUE TO EXCESS CALORIES WITH SERIOUS COMORBIDITY IN ADULT: Status: RESOLVED | Noted: 2017-11-06 | Resolved: 2022-03-30

## 2022-03-30 PROBLEM — E78.1 HYPERTRIGLYCERIDEMIA: Status: RESOLVED | Noted: 2018-02-05 | Resolved: 2022-03-30

## 2022-03-30 PROBLEM — J18.9 RIGHT LOWER LOBE PNEUMONIA: Status: RESOLVED | Noted: 2018-08-07 | Resolved: 2022-03-30

## 2022-03-30 PROBLEM — I26.99 BILATERAL PULMONARY EMBOLISM (H): Status: RESOLVED | Noted: 2019-01-25 | Resolved: 2022-03-30

## 2022-03-30 PROBLEM — F11.21 HISTORY OF NARCOTIC ADDICTION (H): Status: RESOLVED | Noted: 2018-08-07 | Resolved: 2022-03-30

## 2022-03-30 LAB
ALBUMIN SERPL-MCNC: 4.2 G/DL (ref 3.4–5)
ALP SERPL-CCNC: 38 U/L (ref 40–150)
ALT SERPL W P-5'-P-CCNC: 22 U/L (ref 0–70)
ANION GAP SERPL CALCULATED.3IONS-SCNC: 4 MMOL/L (ref 3–14)
AST SERPL W P-5'-P-CCNC: 12 U/L (ref 0–45)
BILIRUB SERPL-MCNC: 0.6 MG/DL (ref 0.2–1.3)
BUN SERPL-MCNC: 13 MG/DL (ref 7–30)
CALCIUM SERPL-MCNC: 9.1 MG/DL (ref 8.5–10.1)
CHLORIDE BLD-SCNC: 108 MMOL/L (ref 94–109)
CHOLEST SERPL-MCNC: 222 MG/DL
CO2 SERPL-SCNC: 30 MMOL/L (ref 20–32)
CREAT SERPL-MCNC: 0.64 MG/DL (ref 0.66–1.25)
FASTING STATUS PATIENT QL REPORTED: YES
GFR SERPL CREATININE-BSD FRML MDRD: >90 ML/MIN/1.73M2
GLUCOSE BLD-MCNC: 106 MG/DL (ref 70–99)
HDLC SERPL-MCNC: 88 MG/DL
LDLC SERPL CALC-MCNC: 120 MG/DL
NONHDLC SERPL-MCNC: 134 MG/DL
POTASSIUM BLD-SCNC: 4.4 MMOL/L (ref 3.4–5.3)
PROT SERPL-MCNC: 7.3 G/DL (ref 6.8–8.8)
SODIUM SERPL-SCNC: 142 MMOL/L (ref 133–144)
TRIGL SERPL-MCNC: 71 MG/DL

## 2022-03-30 PROCEDURE — 80061 LIPID PANEL: CPT | Performed by: FAMILY MEDICINE

## 2022-03-30 PROCEDURE — 99396 PREV VISIT EST AGE 40-64: CPT | Mod: 25 | Performed by: FAMILY MEDICINE

## 2022-03-30 PROCEDURE — 36415 COLL VENOUS BLD VENIPUNCTURE: CPT | Performed by: FAMILY MEDICINE

## 2022-03-30 PROCEDURE — 87389 HIV-1 AG W/HIV-1&-2 AB AG IA: CPT | Performed by: FAMILY MEDICINE

## 2022-03-30 PROCEDURE — 90471 IMMUNIZATION ADMIN: CPT | Performed by: FAMILY MEDICINE

## 2022-03-30 PROCEDURE — 90715 TDAP VACCINE 7 YRS/> IM: CPT | Performed by: FAMILY MEDICINE

## 2022-03-30 PROCEDURE — 91305 COVID-19,PF,PFIZER (12+ YRS): CPT | Performed by: FAMILY MEDICINE

## 2022-03-30 PROCEDURE — 86803 HEPATITIS C AB TEST: CPT | Performed by: FAMILY MEDICINE

## 2022-03-30 PROCEDURE — 0054A COVID-19,PF,PFIZER (12+ YRS): CPT | Performed by: FAMILY MEDICINE

## 2022-03-30 PROCEDURE — 80053 COMPREHEN METABOLIC PANEL: CPT | Performed by: FAMILY MEDICINE

## 2022-03-30 RX ORDER — ZOLPIDEM TARTRATE 10 MG/1
10 TABLET ORAL
Qty: 90 TABLET | Refills: 3 | Status: SHIPPED | OUTPATIENT
Start: 2022-03-30 | End: 2022-12-21

## 2022-03-30 RX ORDER — VARENICLINE TARTRATE 1 MG/1
1 TABLET, FILM COATED ORAL DAILY
Qty: 90 TABLET | Refills: 3 | Status: SHIPPED | OUTPATIENT
Start: 2022-03-30 | End: 2023-05-25

## 2022-03-30 ASSESSMENT — ENCOUNTER SYMPTOMS
PARESTHESIAS: 0
NERVOUS/ANXIOUS: 0
HEARTBURN: 0
ABDOMINAL PAIN: 0
JOINT SWELLING: 0
DIARRHEA: 0
DYSURIA: 0
HEADACHES: 0
CONSTIPATION: 0
COUGH: 0
PALPITATIONS: 0
WEAKNESS: 0
FREQUENCY: 0
SHORTNESS OF BREATH: 0
HEMATURIA: 0
CHILLS: 0
ARTHRALGIAS: 0
NAUSEA: 0
SORE THROAT: 0
FEVER: 0
EYE PAIN: 0
MYALGIAS: 0
HEMATOCHEZIA: 0
DIZZINESS: 0

## 2022-03-30 ASSESSMENT — ASTHMA QUESTIONNAIRES
QUESTION_2 LAST FOUR WEEKS HOW OFTEN HAVE YOU HAD SHORTNESS OF BREATH: NOT AT ALL
ACT_TOTALSCORE: 24
QUESTION_1 LAST FOUR WEEKS HOW MUCH OF THE TIME DID YOUR ASTHMA KEEP YOU FROM GETTING AS MUCH DONE AT WORK, SCHOOL OR AT HOME: NONE OF THE TIME
QUESTION_3 LAST FOUR WEEKS HOW OFTEN DID YOUR ASTHMA SYMPTOMS (WHEEZING, COUGHING, SHORTNESS OF BREATH, CHEST TIGHTNESS OR PAIN) WAKE YOU UP AT NIGHT OR EARLIER THAN USUAL IN THE MORNING: NOT AT ALL
ACT_TOTALSCORE: 24
QUESTION_5 LAST FOUR WEEKS HOW WOULD YOU RATE YOUR ASTHMA CONTROL: WELL CONTROLLED
QUESTION_4 LAST FOUR WEEKS HOW OFTEN HAVE YOU USED YOUR RESCUE INHALER OR NEBULIZER MEDICATION (SUCH AS ALBUTEROL): NOT AT ALL

## 2022-03-30 NOTE — PROGRESS NOTES
"Answers for HPI/ROS submitted by the patient on 3/30/2022  Frequency of exercise:: 6-7 days/week  Getting at least 3 servings of Calcium per day:: Yes  Diet:: Regular (no restrictions)  Taking medications regularly:: Yes  Medication side effects:: Not applicable  Bi-annual eye exam:: Yes  Dental care twice a year:: Yes  Sleep apnea or symptoms of sleep apnea:: None  abdominal pain: No  Blood in stool: No  Blood in urine: No  chest pain: No  chills: No  congestion: No  constipation: No  cough: No  diarrhea: No  dizziness: No  ear pain: No  eye pain: No  nervous/anxious: No  fever: No  frequency: No  genital sores: No  headaches: No  hearing loss: No  heartburn: No  arthralgias: No  joint swelling: No  peripheral edema: No  mood changes: No  myalgias: No  nausea: No  dysuria: No  palpitations: No  Skin sensation changes: No  sore throat: No  urgency: No  rash: No  shortness of breath: No  visual disturbance: No  weakness: No  impotence: No  penile discharge: No  Additional concerns today:: No  Duration of exercise:: 45-60 minutes    Assessment/Plan:     Healthcare Maintenance Exam    Fasting labs pending  Immunizations updated  Healthy lifestyle modifications discussed  Discussed self testicular exams      BMI:   Estimated body mass index is 30.12 kg/m  as calculated from the following:    Height as of this encounter: 1.81 m (5' 11.25\").    Weight as of this encounter: 98.7 kg (217 lb 8 oz).   Weight management plan: Discussed healthy diet and exercise guidelines  CONGRATS ON WEIGHT LOSS!!        Healthcare maintenance  - Lipid panel reflex to direct LDL Fasting  - COMPREHENSIVE METABOLIC PANEL  - Lipid panel reflex to direct LDL Fasting  - COMPREHENSIVE METABOLIC PANEL    Hx of nicotine dependence  No longer smoking  - varenicline (CHANTIX) 1 MG tablet  Dispense: 90 tablet; Refill: 3    Screening for HIV (human immunodeficiency virus)  - HIV Antigen Antibody Combo  - HIV Antigen Antibody Combo    Need for hepatitis C " screening test  - Hepatitis C Screen Reflex to HCV RNA Quant and Genotype  - Hepatitis C Screen Reflex to HCV RNA Quant and Genotype    Insomnia, unspecified type  Refill - can try 1/2 tab daily  - zolpidem (AMBIEN) 10 MG tablet  Dispense: 90 tablet; Refill: 3    High priority for 2019-nCoV vaccine  - COVID-19,PF,PFIZER (12+ Yrs GRAY LABEL)      Patient has been advised of split billing requirements and indicates understanding: Yes    Subjective:     Savage Bustillo is a 41 year old male who presents for an annual exam. Concerns are as follows:    Overall doing well.  Has a almost 1-year-old at home.  Has attended 20-year-old children otherwise.  No specific questions or concerns today.  Needs a refill of his Ambien today.  He uses this most nights.  He is unsure if he needs it or if he is just using it out of habit.    He started a new weight loss plan in February 2020 and has lost significant weight since then.    Healthy Habits:   Regular Exercise: Yes  Sunscreen Use: yes  Healthy Diet: Yes  Dental Visits Regularly: yes  Seat Belt: yes  Sexually active: Yes  Self Testicular Exam Monthly:Yes  Colonoscopy: No  Lipid Profile: Yes  Glucose Screen: Yes    Immunization status: will get COVID booster and Tdap.      Current Outpatient Medications   Medication Sig Dispense Refill     varenicline (CHANTIX) 1 MG tablet Take 1 tablet (1 mg) by mouth daily 90 tablet 3     zolpidem (AMBIEN) 10 MG tablet Take 1 tablet (10 mg) by mouth nightly as needed for sleep 90 tablet 3     albuterol (VENTOLIN HFA) 108 (90 Base) MCG/ACT inhaler INHALE TWO PUFFS BY MOUTH EVERY 6 HOURS FOR 10 DAYS (Patient not taking: Reported on 7/30/2021)       Past Medical History:   Diagnosis Date     Bilateral pulmonary embolism (H) 1/25/2019     Closed bimalleolar fracture of right ankle with routine healing 1/25/2019     History of narcotic addiction (H) 8/7/2018     Opioid dependence in remission (H) 1/25/2019     Past Surgical History:   Procedure  "Laterality Date     ANKLE SURGERY       Patient has no known allergies.  Family History   Problem Relation Age of Onset     No Known Problems Mother      No Known Problems Father      Cancer Father 57.00     Social History     Socioeconomic History     Marital status:      Spouse name: Not on file     Number of children: Not on file     Years of education: Not on file     Highest education level: Not on file   Occupational History     Not on file   Tobacco Use     Smoking status: Former Smoker     Packs/day: 0.50     Types: Cigarettes     Smokeless tobacco: Current User     Types: Chew     Tobacco comment: 3 tins a week   Substance and Sexual Activity     Alcohol use: No     Comment: quit 1-2xweek     Drug use: No     Sexual activity: Yes     Partners: Female   Other Topics Concern     Parent/sibling w/ CABG, MI or angioplasty before 65F 55M? No   Social History Narrative     Not on file     Social Determinants of Health     Financial Resource Strain: Not on file   Food Insecurity: Not on file   Transportation Needs: Not on file   Physical Activity: Not on file   Stress: Not on file   Social Connections: Not on file   Intimate Partner Violence: Not on file   Housing Stability: Not on file       Review of Systems  General:  Denies problems  Eyes:  Denies problems  Ears/Nose/Throat:  Denies problems  Cardiovascular:  Denies problems  Respiratory:  Denies problems  Gastrointestinal:  Denies problems  Genitourinary:  Denies problems  Musculoskeletal:  Denies problems  Skin:  Denies problems  Neurologic:  Denies problems  Psychiatric:  Denies problems  Endocrine:  Denies problems  Heme/Lymphatic:  Denies problems  Allergic/Immunologic:  Denies problems      Objective:      Vitals:    03/30/22 0757   BP: 112/70   Pulse: 58   Resp: 12   Temp: 97.1  F (36.2  C)   TempSrc: Tympanic   SpO2: 96%   Weight: 98.7 kg (217 lb 8 oz)   Height: 1.81 m (5' 11.25\")         Physical Exam:  General Appearance: Alert, cooperative, " no distress, appears stated age  Head: Normocephalic, without obvious abnormality, atraumatic  Eyes: PERRL, conjunctiva/corneas clear, EOM's intact  Ears: Normal TM's and external ear canals, both ears   Neck: Supple, symmetrical, trachea midline, no adenopathy;  thyroid: not enlarged, symmetric, no tenderness/mass/nodules  Back: Symmetric, no curvature, ROM normal, no CVA tenderness   Lungs: Clear to auscultation bilaterally, respirations unlabored  Chest: no visible abnormalities.  Heart: Regular rate and rhythm, S1 and S2 normal, no murmur, rub, or gallop,   Abdomen: Soft, non-tender, bowel sounds active all four quadrants,  no masses, no organomegaly  : deferred today - no concerns  Extremities: Extremities normal, atraumatic, no cyanosis or edema  Skin: Skin color, texture, turgor normal, no rashes or lesions  Lymph nodes: Cervical, supraclavicular, and axillary nodes normal  Neurologic: Normal

## 2022-03-31 LAB
HCV AB SERPL QL IA: NONREACTIVE
HIV 1+2 AB+HIV1 P24 AG SERPL QL IA: NONREACTIVE

## 2022-11-07 NOTE — PATIENT INSTRUCTIONS
Ice your shoulder when sore.   Do hanging arm rolls - increase weight as you go  Stretch your shoulder by grabbing a broom handle with both hands spread apart. Elevate and stretch your sore shoulder by raising that arm with your other arm. Your sore arm is just hanging on to the handle and the handle does the stretching.  Walk your arm up the wall to stretch it  Keep shoulder in one place and rotate elbow side to side       
100

## 2022-12-20 DIAGNOSIS — G47.00 INSOMNIA, UNSPECIFIED TYPE: ICD-10-CM

## 2022-12-21 RX ORDER — ZOLPIDEM TARTRATE 10 MG/1
TABLET ORAL
Qty: 90 TABLET | Refills: 3 | Status: SHIPPED | OUTPATIENT
Start: 2022-12-21 | End: 2024-01-17

## 2023-03-20 ENCOUNTER — VIRTUAL VISIT (OUTPATIENT)
Dept: FAMILY MEDICINE | Facility: CLINIC | Age: 43
End: 2023-03-20
Payer: COMMERCIAL

## 2023-03-20 DIAGNOSIS — J06.9 VIRAL UPPER RESPIRATORY TRACT INFECTION: Primary | ICD-10-CM

## 2023-03-20 PROCEDURE — 99213 OFFICE O/P EST LOW 20 MIN: CPT | Mod: VID | Performed by: FAMILY MEDICINE

## 2023-03-20 RX ORDER — CODEINE PHOSPHATE AND GUAIFENESIN 10; 100 MG/5ML; MG/5ML
1-2 SOLUTION ORAL EVERY 4 HOURS PRN
Qty: 118 ML | Refills: 0 | Status: SHIPPED | OUTPATIENT
Start: 2023-03-20 | End: 2024-02-28

## 2023-03-20 RX ORDER — BENZONATATE 100 MG/1
100 CAPSULE ORAL 3 TIMES DAILY PRN
Qty: 30 CAPSULE | Refills: 0 | Status: SHIPPED | OUTPATIENT
Start: 2023-03-20 | End: 2024-02-28

## 2023-03-20 ASSESSMENT — ENCOUNTER SYMPTOMS
SHORTNESS OF BREATH: 0
RHINORRHEA: 1
GASTROINTESTINAL NEGATIVE: 1
COUGH: 1
SORE THROAT: 1
STRIDOR: 0
FATIGUE: 1

## 2023-03-20 ASSESSMENT — ASTHMA QUESTIONNAIRES
QUESTION_4 LAST FOUR WEEKS HOW OFTEN HAVE YOU USED YOUR RESCUE INHALER OR NEBULIZER MEDICATION (SUCH AS ALBUTEROL): NOT AT ALL
QUESTION_2 LAST FOUR WEEKS HOW OFTEN HAVE YOU HAD SHORTNESS OF BREATH: NOT AT ALL
QUESTION_3 LAST FOUR WEEKS HOW OFTEN DID YOUR ASTHMA SYMPTOMS (WHEEZING, COUGHING, SHORTNESS OF BREATH, CHEST TIGHTNESS OR PAIN) WAKE YOU UP AT NIGHT OR EARLIER THAN USUAL IN THE MORNING: NOT AT ALL
ACT_TOTALSCORE: 25
QUESTION_5 LAST FOUR WEEKS HOW WOULD YOU RATE YOUR ASTHMA CONTROL: COMPLETELY CONTROLLED
QUESTION_1 LAST FOUR WEEKS HOW MUCH OF THE TIME DID YOUR ASTHMA KEEP YOU FROM GETTING AS MUCH DONE AT WORK, SCHOOL OR AT HOME: NONE OF THE TIME
ACT_TOTALSCORE: 25

## 2023-03-20 NOTE — PROGRESS NOTES
nelly is a 42 year old who is being evaluated via a billable video visit.      How would you like to obtain your AVS? MyChart  If the video visit is dropped, the invitation should be resent by: Text to cell phone: [phone]  Will anyone else be joining your video visit? No        Assessment and Plan    (J06.9) Viral upper respiratory tract infection  (primary encounter diagnosis)  Comment: advised to double check home COVID  Plan: benzonatate (TESSALON) 100 MG capsule,         guaiFENesin-codeine (ROBITUSSIN AC) 100-10         MG/5ML solution              RTC in 1w prn    Logan Wayne MD      Subjective   nelly is a 42 year old, presenting for the following health issues:  No chief complaint on file.      History of Present Illness       Reason for visit:  Sever soar throat, both ears hurt, chest pain  Symptom onset:  1-3 days ago  Symptoms include:  Sever soar throat, both ears hurt, chest pain  Symptom intensity:  Severe  Symptom progression:  Staying the same  Had these symptoms before:  No  What makes it worse:  No  What makes it better:  No    He eats 4 or more servings of fruits and vegetables daily.He consumes 0 sweetened beverage(s) daily.He exercises with enough effort to increase his heart rate 60 or more minutes per day.  He exercises with enough effort to increase his heart rate 6 days per week.   He is taking medications regularly.       Acute Illness  Acute illness concerns: sore throat, ear pain, cough  Onset/Duration: ongoing for 3 days  Symptoms:  Fever: YES  Chills/Sweats: YES  Headache (location?): No  Sinus Pressure: No  Conjunctivitis:  No  Ear Pain: YES: bilateral  Rhinorrhea: YES  Congestion: YES  Sore Throat: YES  Cough: YES-productive of clear sputum, with shortness of breath  Wheeze: YES  Decreased Appetite: No  Nausea: No  Vomiting: No  Diarrhea: No  Dysuria/Freq.: No  Dysuria or Hematuria: No  Fatigue/Achiness: YES  Sick/Strep Exposure: No  Therapies tried and outcome: mucinex,  acetaminophen, cold and flu meds    Negative home COVID test yesterday.  Fully COVID vaccinated, did get a flu shot.  Using Dayquil. Muscinex, motrin.    Review of Systems   Constitutional: Positive for fatigue.   HENT: Positive for congestion, ear pain, rhinorrhea and sore throat.    Respiratory: Positive for cough. Negative for shortness of breath and stridor.    Gastrointestinal: Negative.    Skin: Negative.             Objective           Vitals:  No vitals were obtained today due to virtual visit.    Physical Exam   GENERAL: Healthy, alert and no distress  EYES: Eyes grossly normal to inspection.  No discharge or erythema, or obvious scleral/conjunctival abnormalities.  RESP: No audible wheeze, cough, or visible cyanosis.  No visible retractions or increased work of breathing.    SKIN: Visible skin clear. No significant rash, abnormal pigmentation or lesions.  NEURO: Cranial nerves grossly intact.  Mentation and speech appropriate for age.  PSYCH: Mentation appears normal, affect normal/bright, judgement and insight intact, normal speech and appearance well-groomed.                Video-Visit Details    Type of service:  Video Visit     Originating Location (pt. Location): Home  Distant Location (provider location):  On-site  Platform used for Video Visit: Re-vinyl

## 2023-06-02 ENCOUNTER — HEALTH MAINTENANCE LETTER (OUTPATIENT)
Age: 43
End: 2023-06-02

## 2023-07-17 ENCOUNTER — TRANSFERRED RECORDS (OUTPATIENT)
Dept: HEALTH INFORMATION MANAGEMENT | Facility: CLINIC | Age: 43
End: 2023-07-17
Payer: COMMERCIAL

## 2023-09-05 DIAGNOSIS — Z87.891 HX OF NICOTINE DEPENDENCE: ICD-10-CM

## 2023-09-06 RX ORDER — VARENICLINE TARTRATE 1 MG/1
1 TABLET, FILM COATED ORAL DAILY
Qty: 90 TABLET | Refills: 0 | Status: SHIPPED | OUTPATIENT
Start: 2023-09-06 | End: 2023-11-29

## 2023-11-29 DIAGNOSIS — Z87.891 HX OF NICOTINE DEPENDENCE: ICD-10-CM

## 2023-11-29 RX ORDER — VARENICLINE TARTRATE 1 MG/1
TABLET, FILM COATED ORAL
Qty: 90 TABLET | Refills: 0 | Status: SHIPPED | OUTPATIENT
Start: 2023-11-29 | End: 2024-01-18

## 2024-01-17 ENCOUNTER — MYC REFILL (OUTPATIENT)
Dept: FAMILY MEDICINE | Facility: CLINIC | Age: 44
End: 2024-01-17
Payer: COMMERCIAL

## 2024-01-17 DIAGNOSIS — G47.00 INSOMNIA, UNSPECIFIED TYPE: ICD-10-CM

## 2024-01-17 DIAGNOSIS — Z87.891 HX OF NICOTINE DEPENDENCE: ICD-10-CM

## 2024-01-17 RX ORDER — ZOLPIDEM TARTRATE 10 MG/1
10 TABLET ORAL
Qty: 90 TABLET | Refills: 3 | Status: SHIPPED | OUTPATIENT
Start: 2024-01-17

## 2024-01-17 NOTE — TELEPHONE ENCOUNTER
Pending Prescriptions:                       Disp   Refills    zolpidem (AMBIEN) 10 MG tablet            90 tab*3            Sig: Take 1 tablet (10 mg) by mouth nightly as needed           for sleep    Routing refill request to provider for review/approval because:  Drug not on the [facility] refill protocol       Tomasz Oshea RN

## 2024-01-18 RX ORDER — VARENICLINE TARTRATE 1 MG/1
TABLET, FILM COATED ORAL
Qty: 90 TABLET | Refills: 0 | Status: SHIPPED | OUTPATIENT
Start: 2024-01-18 | End: 2024-03-05

## 2024-01-18 RX ORDER — ZOLPIDEM TARTRATE 10 MG/1
TABLET ORAL
Qty: 90 TABLET | Refills: 3 | OUTPATIENT
Start: 2024-01-18

## 2024-03-05 ENCOUNTER — OFFICE VISIT (OUTPATIENT)
Dept: FAMILY MEDICINE | Facility: CLINIC | Age: 44
End: 2024-03-05
Payer: COMMERCIAL

## 2024-03-05 VITALS
WEIGHT: 218.5 LBS | HEIGHT: 71 IN | OXYGEN SATURATION: 98 % | HEART RATE: 62 BPM | SYSTOLIC BLOOD PRESSURE: 122 MMHG | TEMPERATURE: 97.1 F | RESPIRATION RATE: 12 BRPM | BODY MASS INDEX: 30.59 KG/M2 | DIASTOLIC BLOOD PRESSURE: 80 MMHG

## 2024-03-05 DIAGNOSIS — Z87.891 HX OF NICOTINE DEPENDENCE: ICD-10-CM

## 2024-03-05 DIAGNOSIS — Z00.00 ROUTINE GENERAL MEDICAL EXAMINATION AT A HEALTH CARE FACILITY: Primary | ICD-10-CM

## 2024-03-05 LAB
ALBUMIN SERPL BCG-MCNC: 4.6 G/DL (ref 3.5–5.2)
ALP SERPL-CCNC: 37 U/L (ref 40–150)
ALT SERPL W P-5'-P-CCNC: 17 U/L (ref 0–70)
ANION GAP SERPL CALCULATED.3IONS-SCNC: 7 MMOL/L (ref 7–15)
AST SERPL W P-5'-P-CCNC: 21 U/L (ref 0–45)
BILIRUB SERPL-MCNC: 0.6 MG/DL
BUN SERPL-MCNC: 11.5 MG/DL (ref 6–20)
CALCIUM SERPL-MCNC: 9.1 MG/DL (ref 8.6–10)
CHLORIDE SERPL-SCNC: 106 MMOL/L (ref 98–107)
CHOLEST SERPL-MCNC: 220 MG/DL
CREAT SERPL-MCNC: 0.8 MG/DL (ref 0.67–1.17)
DEPRECATED HCO3 PLAS-SCNC: 27 MMOL/L (ref 22–29)
EGFRCR SERPLBLD CKD-EPI 2021: >90 ML/MIN/1.73M2
FASTING STATUS PATIENT QL REPORTED: YES
GLUCOSE SERPL-MCNC: 112 MG/DL (ref 70–99)
HDLC SERPL-MCNC: 95 MG/DL
LDLC SERPL CALC-MCNC: 115 MG/DL
NONHDLC SERPL-MCNC: 125 MG/DL
POTASSIUM SERPL-SCNC: 4.5 MMOL/L (ref 3.4–5.3)
PROT SERPL-MCNC: 6.8 G/DL (ref 6.4–8.3)
SODIUM SERPL-SCNC: 140 MMOL/L (ref 135–145)
TRIGL SERPL-MCNC: 50 MG/DL

## 2024-03-05 PROCEDURE — 80053 COMPREHEN METABOLIC PANEL: CPT | Performed by: FAMILY MEDICINE

## 2024-03-05 PROCEDURE — 80061 LIPID PANEL: CPT | Performed by: FAMILY MEDICINE

## 2024-03-05 PROCEDURE — 99396 PREV VISIT EST AGE 40-64: CPT | Performed by: FAMILY MEDICINE

## 2024-03-05 PROCEDURE — 36415 COLL VENOUS BLD VENIPUNCTURE: CPT | Performed by: FAMILY MEDICINE

## 2024-03-05 RX ORDER — VARENICLINE TARTRATE 1 MG/1
TABLET, FILM COATED ORAL
Qty: 90 TABLET | Refills: 3 | Status: SHIPPED | OUTPATIENT
Start: 2024-03-05

## 2024-03-05 SDOH — HEALTH STABILITY: PHYSICAL HEALTH: ON AVERAGE, HOW MANY DAYS PER WEEK DO YOU ENGAGE IN MODERATE TO STRENUOUS EXERCISE (LIKE A BRISK WALK)?: 7 DAYS

## 2024-03-05 ASSESSMENT — ASTHMA QUESTIONNAIRES
QUESTION_3 LAST FOUR WEEKS HOW OFTEN DID YOUR ASTHMA SYMPTOMS (WHEEZING, COUGHING, SHORTNESS OF BREATH, CHEST TIGHTNESS OR PAIN) WAKE YOU UP AT NIGHT OR EARLIER THAN USUAL IN THE MORNING: NOT AT ALL
QUESTION_1 LAST FOUR WEEKS HOW MUCH OF THE TIME DID YOUR ASTHMA KEEP YOU FROM GETTING AS MUCH DONE AT WORK, SCHOOL OR AT HOME: NONE OF THE TIME
QUESTION_2 LAST FOUR WEEKS HOW OFTEN HAVE YOU HAD SHORTNESS OF BREATH: NOT AT ALL
ACT_TOTALSCORE: 25
QUESTION_4 LAST FOUR WEEKS HOW OFTEN HAVE YOU USED YOUR RESCUE INHALER OR NEBULIZER MEDICATION (SUCH AS ALBUTEROL): NOT AT ALL
ACT_TOTALSCORE: 25
QUESTION_5 LAST FOUR WEEKS HOW WOULD YOU RATE YOUR ASTHMA CONTROL: COMPLETELY CONTROLLED

## 2024-03-05 ASSESSMENT — SOCIAL DETERMINANTS OF HEALTH (SDOH): HOW OFTEN DO YOU GET TOGETHER WITH FRIENDS OR RELATIVES?: ONCE A WEEK

## 2024-03-05 NOTE — PROGRESS NOTES
"Preventive Care Visit  [facility] FELIPE Folyd MD, Family Medicine  Mar 5, 2024    Assessment & Plan     Routine general medical examination at a health care facility  - COMPREHENSIVE METABOLIC PANEL; Future  - Lipid panel reflex to direct LDL Fasting; Future    Hx of nicotine dependence  refill  - varenicline (CHANTIX) 1 MG tablet; TAKE 1 TABLET (1 MG) BY MOUTH DAILY    Patient has been advised of split billing requirements and indicates understanding: Yes          BMI  Estimated body mass index is 30.25 kg/m  as calculated from the following:    Height as of this encounter: 1.81 m (5' 11.26\").    Weight as of this encounter: 99.1 kg (218 lb 8 oz).       Counseling  Appropriate preventive services were discussed with this patient, including applicable screening as appropriate for fall prevention, nutrition, physical activity, Tobacco-use cessation, weight loss and cognition.  Checklist reviewing preventive services available has been given to the patient.  Reviewed patient's diet, addressing concerns and/or questions.   He is at risk for psychosocial distress and has been provided with information to reduce risk.           Subjective   nelly is a 43 year old, presenting for the following:  Physical (Fasting for labs today)     Doing well.  Works selling gloves - going well.    2 1/2 year old and 12 year old and 22 year old (still living at home) - going to [place] this week    Health Care Directive  Patient does not have a Health Care Directive or Living Will: Discussed advance care planning with patient; however, patient declined at this time.    HPI  Doing very well.  Would like a refill of his Chantix.  He has been on this for quite some time and plans to keep taking it as long as insurance covers it.  Also is taking Ambien occasionally.  Sometimes breaks the tablets in half.  Just got a refill of that.            3/5/2024   General Health   How would you rate your overall " physical health? Good   Feel stress (tense, anxious, or unable to sleep) Only a little   (!) STRESS CONCERN      3/5/2024   Nutrition   Three or more servings of calcium each day? Yes   Diet: Regular (no restrictions)   How many servings of fruit and vegetables per day? (!) 2-3   How many sweetened beverages each day? 0-1         3/5/2024   Exercise   Days per week of moderate/strenous exercise 7 days         3/5/2024   Social Factors   Frequency of gathering with friends or relatives Once a week   Worry food won't last until get money to buy more Patient declined   Food not last or not have enough money for food? No   Do you have housing?  Yes   Are you worried about losing your housing? No   Lack of transportation? No   Unable to get utilities (heat,electricity)? No         3/5/2024   Dental   Dentist two times every year? Yes         3/5/2024   TB Screening   Were you born outside of [place]?  No         Today's PHQ-2 Score:       3/5/2024     8:36 AM   PHQ-2 ( 1999 [employer])   Q1: Little interest or pleasure in doing things 0   Q2: Feeling down, depressed or hopeless 0   PHQ-2 Score 0   Q1: Little interest or pleasure in doing things Not at all   Q2: Feeling down, depressed or hopeless Not at all   PHQ-2 Score 0           3/5/2024   Substance Use   Alcohol more than 3/day or more than 7/wk No   Do you use any other substances recreationally? No     Social History     Tobacco Use    Smoking status: Former     Packs/day: .5     Types: Cigarettes    Smokeless tobacco: Current     Types: Chew    Tobacco comments:     3 tins a week   Substance Use Topics    Alcohol use: No     Comment: quit 1-2xweek    Drug use: No           3/5/2024   STI Screening   New sexual partner(s) since last STI/HIV test? No   ASCVD Risk   The 10-year ASCVD risk score (Ana GORDON, et al., 2019) is: 0.8%    Values used to calculate the score:      Age: 43 years      Sex: Male      Is Non- : No      Diabetic: No       "Tobacco smoker: No      Systolic Blood Pressure: 122 mmHg      Is BP treated: No      HDL Cholesterol: 88 mg/dL      Total Cholesterol: 222 mg/dL        3/5/2024   Contraception/Family Planning   Questions about contraception or family planning No        Reviewed and updated as needed this visit by Provider                          Review of Systems  Constitutional, HEENT, cardiovascular, pulmonary, GI, , musculoskeletal, neuro, skin, endocrine and psych systems are negative, except as otherwise noted.     Objective    Exam  /80   Pulse 62   Temp 97.1  F (36.2  C) (Tympanic)   Resp 12   Ht 1.81 m (5' 11.26\")   Wt 99.1 kg (218 lb 8 oz)   SpO2 98%   BMI 30.25 kg/m     Estimated body mass index is 30.25 kg/m  as calculated from the following:    Height as of this encounter: 1.81 m (5' 11.26\").    Weight as of this encounter: 99.1 kg (218 lb 8 oz).    Physical Exam  Objective:  Vital signs reviewed and stable  General: No acute distress  Psych: Appropriate affect  HEENT: moist mucous membranes, pupils equal, round, reactive to light and accomodation, tympanic membranes are pearly grey bilaterally  Lymph: no cervical or supraclavicular lymphadenopathy  Cardiovascular: regular rate and rhythm with no murmur  Pulmonary: clear to auscultation bilaterally with no wheeze  Abdomen: soft, non tender, non distended with normo-active bowel sounds  Extremities: warm and well perfused with no edema  Skin: warm and dry with no rash        Signed Electronically by: Aleah Floyd MD    "

## 2024-03-05 NOTE — PATIENT INSTRUCTIONS
Preventive Care Advice   This is general advice given by our system to help you stay healthy. However, your care team may have specific advice just for you. Please talk to your care team about your preventive care needs.  Nutrition  Eat 5 or more servings of fruits and vegetables each day.  Try wheat bread, brown rice and whole grain pasta (instead of white bread, rice, and pasta).  Get enough calcium and vitamin D. Check the label on foods and aim for 100% of the RDA (recommended daily allowance).  Lifestyle  Exercise at least 150 minutes each week   (30 minutes a day, 5 days a week).  Do muscle strengthening activities 2 days a week. These help control your weight and prevent disease.  No smoking.  Wear sunscreen to prevent skin cancer.  Have a dental exam and cleaning every 6 months.  Yearly exams  See your health care team every year to talk about:  Any changes in your health.  Any medicines your care team has prescribed.  Preventive care, family planning, and ways to prevent chronic diseases.  Shots (vaccines)   HPV shots (up to age 26), if you've never had them before.  Hepatitis B shots (up to age 59), if you've never had them before.  COVID-19 shot: Get this shot when it's due.  Flu shot: Get a flu shot every year.  Tetanus shot: Get a tetanus shot every 10 years.  Pneumococcal, hepatitis A, and RSV shots: Ask your care team if you need these based on your risk.  Shingles shot (for age 50 and up).  General health tests  Diabetes screening:  Starting at age 35, Get screened for diabetes at least every 3 years.  If you are younger than age 35, ask your care team if you should be screened for diabetes.  Cholesterol test: At age 39, start having a cholesterol test every 5 years, or more often if advised.  Bone density scan (DEXA): At age 50, ask your care team if you should have this scan for osteoporosis (brittle bones).  Hepatitis C: Get tested at least once in your life.  STIs (sexually transmitted  infections)  Before age 24: Ask your care team if you should be screened for STIs.  After age 24: Get screened for STIs if you're at risk. You are at risk for STIs (including HIV) if:  You are sexually active with more than one person.  You don't use condoms every time.  You or a partner was diagnosed with a sexually transmitted infection.  If you are at risk for HIV, ask about PrEP medicine to prevent HIV.  Get tested for HIV at least once in your life, whether you are at risk for HIV or not.  Cancer screening tests  Cervical cancer screening: If you have a cervix, begin getting regular cervical cancer screening tests at age 21. Most people who have regular screenings with normal results can stop after age 65. Talk about this with your provider.  Breast cancer scan (mammogram): If you've ever had breasts, begin having regular mammograms starting at age 40. This is a scan to check for breast cancer.  Colon cancer screening: It is important to start screening for colon cancer at age 45.  Have a colonoscopy test every 10 years (or more often if you're at risk) Or, ask your provider about stool tests like a FIT test every year or Cologuard test every 3 years.  To learn more about your testing options, visit: https://[email or web address].  For help making a decision, visit: https://bit.ly/gk65868.  Prostate cancer screening test: If you have a prostate and are age 55 to 69, ask your provider if you would benefit from a yearly prostate cancer screening test.  Lung cancer screening: If you are a current or former smoker age 50 to 80, ask your care team if ongoing lung cancer screenings are right for you.  For informational purposes only. Not to replace the advice of your health care provider. Copyright   2023 [employer]. All rights reserved. Clinically reviewed by the [facility]itions [facility]. [employer] [id number] - REV 01/24.    Learning About Stress  What is stress?     Stress is your  body's response to a hard situation. Your body can have a physical, emotional, or mental response. Stress is a fact of life for most people, and it affects everyone differently. What causes stress for you may not be stressful for someone else.  A lot of things can cause stress. You may feel stress when you go on a job interview, take a test, or run a race. This kind of short-term stress is normal and even useful. It can help you if you need to work hard or react quickly. For example, stress can help you finish an important job on time.  Long-term stress is caused by ongoing stressful situations or events. Examples of long-term stress include long-term health problems, ongoing problems at work, or conflicts in your family. Long-term stress can harm your health.  How does stress affect your health?  When you are stressed, your body responds as though you are in danger. It makes hormones that speed up your heart, make you breathe faster, and give you a burst of energy. This is called the fight-or-flight stress response. If the stress is over quickly, your body goes back to normal and no harm is done.  But if stress happens too often or lasts too long, it can have bad effects. Long-term stress can make you more likely to get sick, and it can make symptoms of some diseases worse. If you tense up when you are stressed, you may develop neck, shoulder, or low back pain. Stress is linked to high blood pressure and heart disease.  Stress also harms your emotional health. It can make you hayse, tense, or depressed. Your relationships may suffer, and you may not do well at work or school.  What can you do to manage stress?  You can try these things to help manage stress:   Do something active. Exercise or activity can help reduce stress. Walking is a great way to get started. Even everyday activities such as housecleaning or yard work can help.  Try yoga or nicolas chi. These techniques combine exercise and meditation. You may need  some training at first to learn them.  Do something you enjoy. For example, listen to music or go to a movie. Practice your hobby or do volunteer work.  Meditate. This can help you relax, because you are not worrying about what happened before or what may happen in the future.  Do guided imagery. Imagine yourself in any setting that helps you feel calm. You can use online videos, books, or a teacher to guide you.  Do breathing exercises. For example:  From a standing position, bend forward from the waist with your knees slightly bent. Let your arms dangle close to the floor.  Breathe in slowly and deeply as you return to a standing position. Roll up slowly and lift your head last.  Hold your breath for just a few seconds in the standing position.  Breathe out slowly and bend forward from the waist.  Let your feelings out. Talk, laugh, cry, and express anger when you need to. Talking with supportive friends or family, a counselor, or a kameron leader about your feelings is a healthy way to relieve stress. Avoid discussing your feelings with people who make you feel worse.  Write. It may help to write about things that are bothering you. This helps you find out how much stress you feel and what is causing it. When you know this, you can find better ways to cope.  What can you do to prevent stress?  You might try some of these things to help prevent stress:  Manage your time. This helps you find time to do the things you want and need to do.  Get enough sleep. Your body recovers from the stresses of the day while you are sleeping.  Get support. Your family, friends, and community can make a difference in how you experience stress.  Limit your news feed. Avoid or limit time on social media or news that may make you feel stressed.  Do something active. Exercise or activity can help reduce stress. Walking is a great way to get started.  Where can you learn more?  Go to https://www.healthwise.net/patiented  Enter N032 in the  "search box to learn more about \"Learning About Stress.\"  Current as of: February 26, 2023               Content Version: 13.8    5482-1921 [employer].   Care instructions adapted under license by your healthcare professional. If you have questions about a medical condition or this instruction, always ask your healthcare professional. [employer] disclaims any warranty or liability for your use of this information.      "

## 2024-12-04 ENCOUNTER — MYC MEDICAL ADVICE (OUTPATIENT)
Dept: FAMILY MEDICINE | Facility: CLINIC | Age: 44
End: 2024-12-04
Payer: COMMERCIAL

## 2025-02-03 ENCOUNTER — PATIENT OUTREACH (OUTPATIENT)
Dept: CARE COORDINATION | Facility: CLINIC | Age: 45
End: 2025-02-03
Payer: COMMERCIAL

## 2025-02-17 ENCOUNTER — PATIENT OUTREACH (OUTPATIENT)
Dept: CARE COORDINATION | Facility: CLINIC | Age: 45
End: 2025-02-17
Payer: COMMERCIAL

## 2025-04-09 ENCOUNTER — OFFICE VISIT (OUTPATIENT)
Dept: FAMILY MEDICINE | Facility: CLINIC | Age: 45
End: 2025-04-09
Payer: COMMERCIAL

## 2025-04-09 VITALS
WEIGHT: 205 LBS | OXYGEN SATURATION: 97 % | TEMPERATURE: 96.8 F | SYSTOLIC BLOOD PRESSURE: 106 MMHG | HEIGHT: 71 IN | BODY MASS INDEX: 28.7 KG/M2 | RESPIRATION RATE: 18 BRPM | DIASTOLIC BLOOD PRESSURE: 64 MMHG | HEART RATE: 55 BPM

## 2025-04-09 DIAGNOSIS — N52.02 CORPORO-VENOUS OCCLUSIVE ERECTILE DYSFUNCTION: Primary | ICD-10-CM

## 2025-04-09 DIAGNOSIS — E78.5 HYPERLIPIDEMIA LDL GOAL <100: ICD-10-CM

## 2025-04-09 DIAGNOSIS — J45.20 MILD INTERMITTENT ASTHMA WITHOUT COMPLICATION: ICD-10-CM

## 2025-04-09 LAB
ALBUMIN SERPL BCG-MCNC: 4.6 G/DL (ref 3.5–5.2)
ALP SERPL-CCNC: 44 U/L (ref 40–150)
ALT SERPL W P-5'-P-CCNC: 18 U/L (ref 0–70)
ANION GAP SERPL CALCULATED.3IONS-SCNC: 10 MMOL/L (ref 7–15)
AST SERPL W P-5'-P-CCNC: 25 U/L (ref 0–45)
BILIRUB SERPL-MCNC: 0.5 MG/DL
BUN SERPL-MCNC: 11.7 MG/DL (ref 6–20)
CALCIUM SERPL-MCNC: 9.4 MG/DL (ref 8.8–10.4)
CHLORIDE SERPL-SCNC: 105 MMOL/L (ref 98–107)
CHOLEST SERPL-MCNC: 185 MG/DL
CREAT SERPL-MCNC: 0.81 MG/DL (ref 0.67–1.17)
EGFRCR SERPLBLD CKD-EPI 2021: >90 ML/MIN/1.73M2
FASTING STATUS PATIENT QL REPORTED: YES
FASTING STATUS PATIENT QL REPORTED: YES
GLUCOSE SERPL-MCNC: 98 MG/DL (ref 70–99)
HCO3 SERPL-SCNC: 25 MMOL/L (ref 22–29)
HDLC SERPL-MCNC: 60 MG/DL
LDLC SERPL CALC-MCNC: 102 MG/DL
NONHDLC SERPL-MCNC: 125 MG/DL
POTASSIUM SERPL-SCNC: 4.1 MMOL/L (ref 3.4–5.3)
PROT SERPL-MCNC: 6.8 G/DL (ref 6.4–8.3)
SODIUM SERPL-SCNC: 140 MMOL/L (ref 135–145)
TRIGL SERPL-MCNC: 116 MG/DL

## 2025-04-09 PROCEDURE — 36415 COLL VENOUS BLD VENIPUNCTURE: CPT | Performed by: FAMILY MEDICINE

## 2025-04-09 PROCEDURE — 80053 COMPREHEN METABOLIC PANEL: CPT | Performed by: FAMILY MEDICINE

## 2025-04-09 PROCEDURE — 80061 LIPID PANEL: CPT | Performed by: FAMILY MEDICINE

## 2025-04-09 RX ORDER — SILDENAFIL 100 MG/1
100 TABLET, FILM COATED ORAL DAILY PRN
Qty: 6 TABLET | Refills: 4 | Status: SHIPPED | OUTPATIENT
Start: 2025-04-09

## 2025-04-09 RX ORDER — ALBUTEROL SULFATE 90 UG/1
2 INHALANT RESPIRATORY (INHALATION) EVERY 6 HOURS PRN
Qty: 18 G | Refills: 0 | Status: SHIPPED | OUTPATIENT
Start: 2025-04-09

## 2025-04-09 SDOH — HEALTH STABILITY: PHYSICAL HEALTH: ON AVERAGE, HOW MANY MINUTES DO YOU ENGAGE IN EXERCISE AT THIS LEVEL?: 60 MIN

## 2025-04-09 SDOH — HEALTH STABILITY: PHYSICAL HEALTH: ON AVERAGE, HOW MANY DAYS PER WEEK DO YOU ENGAGE IN MODERATE TO STRENUOUS EXERCISE (LIKE A BRISK WALK)?: 7 DAYS

## 2025-04-09 ASSESSMENT — ANXIETY QUESTIONNAIRES
2. NOT BEING ABLE TO STOP OR CONTROL WORRYING: SEVERAL DAYS
GAD7 TOTAL SCORE: 6
7. FEELING AFRAID AS IF SOMETHING AWFUL MIGHT HAPPEN: NOT AT ALL
3. WORRYING TOO MUCH ABOUT DIFFERENT THINGS: SEVERAL DAYS
6. BECOMING EASILY ANNOYED OR IRRITABLE: SEVERAL DAYS
GAD7 TOTAL SCORE: 6
7. FEELING AFRAID AS IF SOMETHING AWFUL MIGHT HAPPEN: NOT AT ALL
IF YOU CHECKED OFF ANY PROBLEMS ON THIS QUESTIONNAIRE, HOW DIFFICULT HAVE THESE PROBLEMS MADE IT FOR YOU TO DO YOUR WORK, TAKE CARE OF THINGS AT HOME, OR GET ALONG WITH OTHER PEOPLE: SOMEWHAT DIFFICULT
5. BEING SO RESTLESS THAT IT IS HARD TO SIT STILL: SEVERAL DAYS
4. TROUBLE RELAXING: SEVERAL DAYS
GAD7 TOTAL SCORE: 6
1. FEELING NERVOUS, ANXIOUS, OR ON EDGE: SEVERAL DAYS
8. IF YOU CHECKED OFF ANY PROBLEMS, HOW DIFFICULT HAVE THESE MADE IT FOR YOU TO DO YOUR WORK, TAKE CARE OF THINGS AT HOME, OR GET ALONG WITH OTHER PEOPLE?: SOMEWHAT DIFFICULT

## 2025-04-09 ASSESSMENT — ASTHMA QUESTIONNAIRES
QUESTION_2 LAST FOUR WEEKS HOW OFTEN HAVE YOU HAD SHORTNESS OF BREATH: NOT AT ALL
QUESTION_5 LAST FOUR WEEKS HOW WOULD YOU RATE YOUR ASTHMA CONTROL: COMPLETELY CONTROLLED
QUESTION_1 LAST FOUR WEEKS HOW MUCH OF THE TIME DID YOUR ASTHMA KEEP YOU FROM GETTING AS MUCH DONE AT WORK, SCHOOL OR AT HOME: NONE OF THE TIME
QUESTION_4 LAST FOUR WEEKS HOW OFTEN HAVE YOU USED YOUR RESCUE INHALER OR NEBULIZER MEDICATION (SUCH AS ALBUTEROL): NOT AT ALL
ACT_TOTALSCORE: 25
QUESTION_3 LAST FOUR WEEKS HOW OFTEN DID YOUR ASTHMA SYMPTOMS (WHEEZING, COUGHING, SHORTNESS OF BREATH, CHEST TIGHTNESS OR PAIN) WAKE YOU UP AT NIGHT OR EARLIER THAN USUAL IN THE MORNING: NOT AT ALL

## 2025-04-09 ASSESSMENT — PAIN SCALES - GENERAL: PAINLEVEL_OUTOF10: NO PAIN (0)

## 2025-04-09 ASSESSMENT — SOCIAL DETERMINANTS OF HEALTH (SDOH): HOW OFTEN DO YOU GET TOGETHER WITH FRIENDS OR RELATIVES?: ONCE A WEEK

## 2025-04-09 NOTE — PATIENT INSTRUCTIONS
Relaxation technique 1: Breathing meditation for stress relief  With its focus on full, cleansing breaths, deep breathing is a simple, yet powerful, relaxation technique. It s easy to learn, can be practiced almost anywhere, and provides a quick way to get your stress levels in check. Deep breathing is the cornerstone of many other relaxation practices, too, and can be combined with other relaxing elements such as aromatherapy and music. All you really need is a few minutes and a place to stretch out.  Practicing deep breathing meditation  The key to deep breathing is to breathe deeply from the abdomen, getting as much fresh air as possible in your lungs. When you take deep breaths from the abdomen, rather than shallow breaths from your upper chest, you inhale more oxygen. The more oxygen you get, the less tense, short of breath, and anxious you feel.  Sit comfortably with your back straight. Put one hand on your chest and the other on your stomach.   Breathe in through your nose. The hand on your stomach should rise. The hand on your chest should move very little.   Exhale through your mouth, pushing out as much air as you can while areli your abdominal muscles. The hand on your stomach should move in as you exhale, but your other hand should move very little.   Continue to breathe in through your nose and out through your mouth. Try to inhale enough so that your lower abdomen rises and falls. Count slowly as you exhale.   If you find it difficult breathing from your abdomen while sitting up, try lying on the floor. Put a small book on your stomach, and try to breathe so that the book rises as you inhale and falls as you exhale.   Relaxation technique 2: Progressive muscle relaxation for stress relief  Progressive muscle relaxation involves a two-step process in which you systematically tense and relax different muscle groups in the body.  With regular practice, progressive muscle relaxation gives you an  intimate familiarity with what tension--as well as complete relaxation--feels like in different parts of the body. This awareness helps you spot and counteract the first signs of the muscular tension that accompanies stress. And as your body relaxes, so will your mind. You can combine deep breathing with progressive muscle relaxation for an additional level of stress relief.  Practicing progressive muscle relaxation  Before practicing Progressive Muscle Relaxation, consult with your doctor if you have a history of muscle spasms, back problems, or other serious injuries that may be aggravated by tensing muscles.  Most progressive muscle relaxation practitioners start at the feet and work their way up to the face. For a sequence of muscle groups to follow, see the box below.  Loosen your clothing, take off your shoes, and get comfortable.   Take a few minutes to relax, breathing in and out in slow, deep breaths.   When you re relaxed and ready to start, shift your attention to your right foot. Take a moment to focus on the way it feels.   Slowly tense the muscles in your right foot, squeezing as tightly as you can. Hold for a count of 10.   Relax your right foot. Focus on the tension flowing away and the way your foot feels as it becomes limp and loose.   Stay in this relaxed state for a moment, breathing deeply and slowly.   When you re ready, shift your attention to your left foot. Follow the same sequence of muscle tension and release.   Move slowly up through your body, areli and relaxing the muscle groups as you go.   It may take some practice at first, but try not to tense muscles other than those intended.   Progressive Muscle Relaxation Sequence  The most popular sequence runs as follows:  Right foot*   Left foot   Right calf   Left calf   Right thigh  Left thigh   Hips and buttocks   Stomach   Chest   Back  Right arm and hand   Left arm and hand   Neck and shoulders   Face    * If you are left-handed  you may want to begin with your left foot instead.  Relaxation technique 3: Body scan meditation for stress relief  A body scan is similar to progressive muscle relaxation except, instead of tensing and relaxing muscles, you simply focus on the sensations in each part of your body.   Practicing body scan meditation  Lie on your back, legs uncrossed, arms relaxed at your sides, eyes open or closed. Focus on your breathing , allowing your stomach to rise as you inhale and fall as you exhale. Breathe deeply for about two minutes, until you start to feel comfortable and relaxed.   Turn your focus to the toes of your right foot. Notice any sensations you feel while continuing to also focus on your breathing. Imagine each deep breath flowing to your toes. Remain focused on this area for one to two minutes.   Move your focus to the sole of your right foot. Tune in to any sensations you feel in that part of your body and imagine each breath flowing from the sole of your foot. After one or two minutes, move your focus to your right ankle and repeat. Move to your calf, knee, thigh, hip, and then repeat the sequence for your left leg. From there, move up the torso, through the lower back and abdomen, the upper back and chest, and the shoulders. Pay close attention to any area of the body that causes you pain or discomfort.   Move your focus to the fingers on your right hand and then move up to the wrist, forearm, elbow, upper arm, and shoulder. Repeat for your left arm. Then move through the neck and throat, and finally all the regions of your face, the back of the head, and the top of the head. Pay close attention to your jaw, chin, lips, tongue, nose, cheeks, eyes, forehead, temples and scalp. When you reach the very top of your head, let your breath reach out beyond your body and imagine yourself hovering above yourself.   After completing the body scan, relax for a while in silence and stillness, noting how your body  feels. Then open your eyes slowly. Take a moment to stretch, if necessary.   For a guided body scan meditation, see the Resources section below.  Relaxation technique 4: Mindfulness for stress relief  Mindfulness is the ability to remain aware of how you re feeling right now, your  [id number]  experience--both internal and external. Thinking about the past--blaming and judging yourself--or worrying about the future can often lead to a degree of stress that is overwhelming. But by staying calm and focused in the present moment, you can bring your nervous system back into balance. Mindfulness can be applied to activities such as walking, exercising, eating, or meditation.  Meditations that cultivate mindfulness have long been used to reduce overwhelming stress. Some of these meditations bring you into the present by focusing your attention on a single repetitive action, such as your breathing, a few repeated words, or flickering light from a candle. Other forms of mindfulness meditation encourage you to follow and then release internal thoughts or sensations.  Practicing mindfulness meditation  Key points in mindfulness mediation are:  A quiet environment. Choose a secluded place in your home, office, garden, place of [place], or in the great outdoors where you can relax without distractions or interruptions.   A comfortable position. Get comfortable, but avoid lying down as this may lead to you falling asleep. Sit up with your spine straight, either in a chair or on the floor. You can also try a cross-legged or winter position.   A point of focus. This point can be internal - a feeling or imaginary scene - or something external - a flame or meaningful word or phrase that you repeat it throughout your session. You may meditate with eyes open or closed. Also choose to focus on an object in your surroundings to enhance your concentration, or alternately, you can close your eyes.   An observant, noncritical  attitude. Don t worry about distracting thoughts that go through your mind or about how well you re doing. If thoughts intrude during your relaxation session, don t fight them. Instead, gently turn your attention back to your point of focus.   Relaxation technique 5: Visualization meditation for stress relief  Visualization, or guided imagery, is a variation on traditional meditation that requires you to employ not only your visual sense, but also your sense of taste, touch, smell, and sound. When used as a relaxation technique, visualization involves imagining a scene in which you feel at peace, free to let go of all tension and anxiety.  Choose whatever setting is most calming to you, whether it s a tropical beach, a favorite childhood spot, or a quiet wooded jen. You can do this visualization exercise on your own in silence, while listening to soothing music, or with a therapist (or an audio recording of a therapist) guiding you through the imagery. To help you employ your sense of hearing you can use a sound machine or download sounds that match your chosen setting--the sound of ocean waves if you ve chosen a beach, for example.  Practicing visualization  Find a quiet, relaxed place. Beginners sometimes fall asleep during a visualization meditation, so you might try sitting up or standing.  Close your eyes and let your worries drift away. Imagine your restful place. Picture it as vividly as you can--everything you can see, hear, smell, and feel. Visualization works best if you incorporate as many sensory details as possible, using at least three of your senses. When visualizing, choose imagery that appeals to you; don t select images because someone else suggests them, or because you think they should be appealing. Let your own images come up and work for you.  If you are thinking about a dock on a quiet lake, for example:   Walk slowly around the dock and notice the colors and textures around you.   Spend some  time exploring each of your senses.   See the sun setting over the water.   Hear the birds singing.   Smell the pine trees.   Feel the cool water on your bare feet.   Taste the fresh, clean air.   Enjoy the feeling of deep relaxation that envelopes you as you slowly explore your restful place. When you are ready, gently open your eyes and come back to the present.  Don't worry if you sometimes zone out or lose track of where you are during a guided imagery session. This is normal. You may also experience feelings of stiffness or heaviness in your limbs, minor, involuntary muscle-movements, or even cough or yawn. Again, these are normal responses.   Relaxation technique 6: Yoga and nicolas chi for stress relief  Yoga involves a series of both moving and stationary poses, combined with deep breathing. As well as reducing anxiety and stress, yoga can also improve flexibility, strength, balance, and stamina. Practiced regularly, it can also strengthen the relaxation response in your daily life. Since injuries can happen when yoga is practiced incorrectly, it s best to learn by attending group classes, hiring a private teacher, or at least following video instructions.  What type of yoga is best for stress?  Although almost all yoga classes end in a relaxation pose, classes that emphasize slow, steady movement, deep breathing, and gentle stretching are best for stress relief.  Satyananda is a traditional form of yoga. It features gentle poses, deep relaxation, and meditation, making it suitable for beginners as well as anyone primarily looking for stress reduction.   Hatha yoga is also reasonably gentle way to relieve stress and is suitable for beginners. Alternately, look for labels like gentle, for stress relief, or for beginners when selecting a yoga class.   Power yoga, with its intense poses and focus on fitness, is better suited to those looking for stimulation as well as relaxation.   If you re unsure whether a  specific yoga class is appropriate for stress relief, call the studio or ask the teacher.  Martín chi  If you ve ever seen a group of people in the park slowly moving in synch, you ve probably witnessed martín chi. Martín chi is a self-paced, non-competitive series of slow, flowing body movements. These movements emphasize concentration, relaxation, and the conscious circulation of vital energy throughout the body. Though martín chi has its roots in martial arts, today it is primarily practiced as a way of calming the mind, conditioning the body, and reducing stress. As in meditation, martín chi practitioners focus on their breathing and keeping their attention in the present moment.  Martín chi is a safe, low-impact option for people of all ages and levels of fitness, including older adults and those recovering from injuries. Like yoga, once you ve learned the basics of martín chi or qi gong, you can practice alone or with others, tailoring your sessions as you see fit.   Making relaxation techniques a part of your life  The best way to start and maintain a relaxation practice is to incorporate it into your daily routine. Between work, family, school, and other commitments, though, it can be tough for many people to find the time. Fortunately, many of the techniques can be practiced while you re doing other things.  Rhythmic exercise as a mindfulness relaxation technique  Rhythmic exercise--such as running, walking, rowing, or cycling--is most effective at relieving stress when performed with relaxation in mind. As with meditation, mindfulness requires being fully engaged in the present moment, focusing your mind on how your body feels right now. As you exercise, focus on the physicality of your body s movement and how your breathing complements that movement. If your mind wanders to other thoughts, gently return to focusing on your breathing and movement.  If walking or running, for example, focus on each step--the sensation of your  feet touching the ground, the rhythm of your breath while moving, and the feeling of the wind against your face.  Tips for fitting relaxation techniques into your life  If possible, schedule a set time to practice each day. Set aside one or two periods each day. You may find that it s easier to stick with your practice if you do it first thing in the morning, before other tasks and responsibilities get in the way.   Practice relaxation techniques while you re doing other things. Meditate while commuting to work on a bus or train, or waiting for a dentist appointment. Try deep breathing while you re doing housework or mowing the lawn. Mindfulness walking can be done while exercising your dog, walking to your car, or climbing the stairs at work instead of using the elevator. Once you ve learned techniques such as nicolas chi, you can practice them in your office or in the park at lunchtime.   If you exercise, improve the relaxation benefits by adopting mindfulness. Instead of zoning out or staring at a TV as you exercise, try focusing your attention on your body. If you re resistance training, for example, focus on coordinating your breathing with your movements and pay attention to how your body feels as you raise and lower the weights.   Avoid practicing when you re sleepy. These techniques can relax you so much that they can make you very sleepy, especially if it s close to bedtime. You will get the most benefit if you practice when you re fully awake and alert. Do not practice after eating a heavy meal or while using drugs, tobacco, or alcohol.   Expect ups and downs. Don t be discouraged if you skip a few days or even a few weeks. It happens. Just get started again and slowly build up to your old momentum.

## 2025-04-09 NOTE — PROGRESS NOTES
"Preventive Care Visit  [facility] FELIPE Walsh MD, Family Medicine  Apr 9, 2025      Assessment & Plan   Wt Readings from Last 10 Encounters:   04/09/25 93 kg (205 lb)   03/05/24 99.1 kg (218 lb 8 oz)   03/30/22 98.7 kg (217 lb 8 oz)   07/30/21 101.1 kg (222 lb 12.8 oz)   03/17/21 106.7 kg (235 lb 2 oz)   02/10/20 131.8 kg (290 lb 9 oz)   05/31/19 132.6 kg (292 lb 4.8 oz)   02/11/19 131.7 kg (290 lb 4 oz)   12/21/18 117.9 kg (260 lb)   10/02/18 122.3 kg (269 lb 9.6 oz)       Patient has been advised of split billing requirements and indicates understanding: Yes        BMI  Estimated body mass index is 28.86 kg/m  as calculated from the following:    Height as of this encounter: 1.795 m (5' 10.67\").    Weight as of this encounter: 93 kg (205 lb).   Weight management plan: Discussed healthy diet and exercise guidelines    Counseling  Appropriate preventive services were addressed with this patient via screening, questionnaire, or discussion as appropriate for fall prevention, nutrition, physical activity, Tobacco-use cessation, social engagement, weight loss and cognition.  Checklist reviewing preventive services available has been given to the patient.  Reviewed patient's diet, addressing concerns and/or questions.       Subjective   nelly is a 44 year old, presenting for the following:  Physical        4/9/2025     3:14 PM   Additional Questions   Roomed by Kori Mccain CMA   Accompanied by self          HPI     Advance Care Planning  Patient does not have a Health Care Directive:      4/9/2025   General Health   How would you rate your overall physical health? Excellent   Feel stress (tense, anxious, or unable to sleep) Only a little   (!) STRESS CONCERN      4/9/2025   Nutrition   Three or more servings of calcium each day? Yes   Diet: Low fat/cholesterol    Carbohydrate counting   How many servings of fruit and vegetables per day? 4 or more   How many sweetened beverages each day? 0-1    "    Multiple values from one day are sorted in reverse-chronological order         4/9/2025   Exercise   Days per week of moderate/strenous exercise 7 days   Average minutes spent exercising at this level 60 min         4/9/2025   Social Factors   Frequency of gathering with friends or relatives Once a week   Worry food won't last until get money to buy more No   Food not last or not have enough money for food? No   Do you have housing? (Housing is defined as stable permanent housing and does not include staying ouside in a car, in a tent, in an abandoned building, in an overnight shelter, or couch-surfing.) No   Are you worried about losing your housing? No   Lack of transportation? No   Unable to get utilities (heat,electricity)? No   Want help with housing or utility concern? No   (!) HOUSING CONCERN PRESENT      4/9/2025   Dental   Dentist two times every year? Yes           3/5/2024   TB Screening   Were you born outside of the [place]? No     Today's PHQ-2 Score:       4/9/2025     1:54 PM   PHQ-2 ( 1999 [employer])   Q1: Little interest or pleasure in doing things 1   Q2: Feeling down, depressed or hopeless 1   PHQ-2 Score 2    Q1: Little interest or pleasure in doing things Several days   Q2: Feeling down, depressed or hopeless Several days   PHQ-2 Score 2       Patient-reported           4/9/2025   Substance Use   Alcohol more than 3/day or more than 7/wk No   Do you use any other substances recreationally? No     Social History     Tobacco Use    Smoking status: Former     Current packs/day: 0.50     Types: Cigarettes    Smokeless tobacco: Current     Types: Chew    Tobacco comments:     3 tins a week   Vaping Use    Vaping status: Never Used   Substance Use Topics    Alcohol use: No     Comment: quit 1-2xweek    Drug use: No           4/9/2025   STI Screening   New sexual partner(s) since last STI/HIV test? No   ASCVD Risk   The 10-year ASCVD risk score (Ana GORDON, et al., 2019) is: 0.7%    Values used to  "calculate the score:      Age: 44 years      Sex: Male      Is Non- : No      Diabetic: No      Tobacco smoker: No      Systolic Blood Pressure: 106 mmHg      Is BP treated: No      HDL Cholesterol: 95 mg/dL      Total Cholesterol: 220 mg/dL        4/9/2025   Contraception/Family Planning   Questions about contraception or family planning No        Reviewed and updated as needed this visit by Provider                    Review of Systems  Constitutional, HEENT, cardiovascular, pulmonary, gi and gu systems are negative, except as otherwise noted.     Objective    Exam  /64 (BP Location: Right arm, Patient Position: Sitting, Cuff Size: Adult Regular)   Pulse 55   Temp 96.8  F (36  C) (Tympanic)   Resp 18   Ht 1.795 m (5' 10.67\")   Wt 93 kg (205 lb)   SpO2 97%   BMI 28.86 kg/m     Estimated body mass index is 28.86 kg/m  as calculated from the following:    Height as of this encounter: 1.795 m (5' 10.67\").    Weight as of this encounter: 93 kg (205 lb).    Physical Exam  GENERAL: alert and no distress  NECK: no adenopathy, no asymmetry, masses, or scars  RESP: lungs clear to auscultation - no rales, rhonchi or wheezes  CV: regular rate and rhythm, normal S1 S2, no S3 or S4, no murmur, click or rub, no peripheral edema  ABDOMEN: soft, nontender, no hepatosplenomegaly, no masses and bowel sounds normal  MS: no gross musculoskeletal defects noted, no edema    Signed Electronically by: Bayron Walsh MD    Answers submitted by the patient for this visit:  Patient Health Questionnaire (G7) (Submitted on 4/9/2025)  TARA 7 TOTAL SCORE: 6    "

## 2025-04-27 DIAGNOSIS — J45.20 MILD INTERMITTENT ASTHMA WITHOUT COMPLICATION: ICD-10-CM

## 2025-04-28 RX ORDER — ALBUTEROL SULFATE 90 UG/1
INHALANT RESPIRATORY (INHALATION)
Qty: 8.5 G | Refills: 0 | Status: SHIPPED | OUTPATIENT
Start: 2025-04-28